# Patient Record
Sex: MALE | Race: WHITE | NOT HISPANIC OR LATINO | Employment: FULL TIME | URBAN - METROPOLITAN AREA
[De-identification: names, ages, dates, MRNs, and addresses within clinical notes are randomized per-mention and may not be internally consistent; named-entity substitution may affect disease eponyms.]

---

## 2019-05-21 ENCOUNTER — HOSPITAL ENCOUNTER (EMERGENCY)
Facility: HOSPITAL | Age: 34
Discharge: LEFT BEFORE DISCHARGE | End: 2019-05-21
Attending: EMERGENCY MEDICINE
Payer: COMMERCIAL

## 2019-05-21 ENCOUNTER — APPOINTMENT (EMERGENCY)
Dept: ULTRASOUND IMAGING | Facility: HOSPITAL | Age: 34
End: 2019-05-21
Payer: COMMERCIAL

## 2019-05-21 VITALS
TEMPERATURE: 98.6 F | RESPIRATION RATE: 18 BRPM | SYSTOLIC BLOOD PRESSURE: 111 MMHG | WEIGHT: 186.95 LBS | DIASTOLIC BLOOD PRESSURE: 63 MMHG | HEART RATE: 78 BPM | OXYGEN SATURATION: 99 %

## 2019-05-21 DIAGNOSIS — N50.819 EPIDIDYMAL PAIN: Primary | ICD-10-CM

## 2019-05-21 LAB
BACTERIA UR QL AUTO: ABNORMAL /HPF
BILIRUB UR QL STRIP: NEGATIVE
CLARITY UR: CLEAR
COLOR UR: YELLOW
GLUCOSE UR STRIP-MCNC: NEGATIVE MG/DL
HGB UR QL STRIP.AUTO: ABNORMAL
KETONES UR STRIP-MCNC: ABNORMAL MG/DL
LEUKOCYTE ESTERASE UR QL STRIP: NEGATIVE
NITRITE UR QL STRIP: NEGATIVE
NON-SQ EPI CELLS URNS QL MICRO: ABNORMAL /HPF
PH UR STRIP.AUTO: 5.5 [PH]
PROT UR STRIP-MCNC: ABNORMAL MG/DL
RBC #/AREA URNS AUTO: ABNORMAL /HPF
SP GR UR STRIP.AUTO: >=1.03 (ref 1–1.03)
UROBILINOGEN UR QL STRIP.AUTO: 2 E.U./DL
WBC #/AREA URNS AUTO: ABNORMAL /HPF

## 2019-05-21 PROCEDURE — 87491 CHLMYD TRACH DNA AMP PROBE: CPT | Performed by: EMERGENCY MEDICINE

## 2019-05-21 PROCEDURE — 87591 N.GONORRHOEAE DNA AMP PROB: CPT | Performed by: EMERGENCY MEDICINE

## 2019-05-21 PROCEDURE — 99284 EMERGENCY DEPT VISIT MOD MDM: CPT

## 2019-05-21 PROCEDURE — 81001 URINALYSIS AUTO W/SCOPE: CPT | Performed by: EMERGENCY MEDICINE

## 2019-05-21 PROCEDURE — 99283 EMERGENCY DEPT VISIT LOW MDM: CPT | Performed by: EMERGENCY MEDICINE

## 2019-05-21 RX ORDER — DOXYCYCLINE HYCLATE 100 MG/1
100 CAPSULE ORAL 2 TIMES DAILY
Qty: 20 CAPSULE | Refills: 0 | Status: SHIPPED | OUTPATIENT
Start: 2019-05-21 | End: 2019-05-31

## 2019-05-22 LAB
C TRACH DNA SPEC QL NAA+PROBE: NEGATIVE
N GONORRHOEA DNA SPEC QL NAA+PROBE: NEGATIVE

## 2019-06-21 ENCOUNTER — HOSPITAL ENCOUNTER (EMERGENCY)
Facility: HOSPITAL | Age: 34
Discharge: HOME | End: 2019-06-21
Attending: EMERGENCY MEDICINE
Payer: COMMERCIAL

## 2019-06-21 VITALS
RESPIRATION RATE: 16 BRPM | HEART RATE: 130 BPM | SYSTOLIC BLOOD PRESSURE: 140 MMHG | DIASTOLIC BLOOD PRESSURE: 79 MMHG | OXYGEN SATURATION: 99 % | TEMPERATURE: 98.9 F

## 2019-06-21 DIAGNOSIS — H72.92 PERFORATED TYMPANIC MEMBRANE, LEFT: Primary | ICD-10-CM

## 2019-06-21 DIAGNOSIS — F41.9 ANXIETY: ICD-10-CM

## 2019-06-21 DIAGNOSIS — J06.9 ACUTE URI: ICD-10-CM

## 2019-06-21 DIAGNOSIS — R00.0 SINUS TACHYCARDIA: ICD-10-CM

## 2019-06-21 LAB
ATRIAL RATE: 128 BPM
P AXIS: 89 DEGREES
PR INTERVAL: 160 MS
QRS AXIS: 91 DEGREES
QRSD INTERVAL: 92 MS
QT INTERVAL: 284 MS
QTC INTERVAL: 414 MS
T WAVE AXIS: 27 DEGREES
VENTRICULAR RATE: 128 BPM

## 2019-06-21 PROCEDURE — 99283 EMERGENCY DEPT VISIT LOW MDM: CPT

## 2019-06-21 PROCEDURE — 93010 ELECTROCARDIOGRAM REPORT: CPT | Performed by: INTERNAL MEDICINE

## 2019-06-21 PROCEDURE — 93005 ELECTROCARDIOGRAM TRACING: CPT

## 2019-06-21 PROCEDURE — 99283 EMERGENCY DEPT VISIT LOW MDM: CPT | Performed by: EMERGENCY MEDICINE

## 2019-06-21 RX ORDER — GUAIFENESIN 600 MG
600 TABLET, EXTENDED RELEASE 12 HR ORAL 2 TIMES DAILY
Qty: 10 TABLET | Refills: 0 | Status: SHIPPED | OUTPATIENT
Start: 2019-06-21 | End: 2019-06-26

## 2019-06-21 RX ORDER — FLUTICASONE PROPIONATE 50 MCG
1 SPRAY, SUSPENSION (ML) NASAL DAILY
Qty: 16 G | Refills: 0 | Status: SHIPPED | OUTPATIENT
Start: 2019-06-21 | End: 2019-12-14

## 2019-06-21 NOTE — ED PROVIDER NOTES
History  Chief Complaint   Patient presents with    Earache     Pt c/o left earache x1 week  Pt states having bloody discharge from ear  Also having fevers at home  Pt states having nasal congestion  During triage is noted pt's HR is 130  Denies CP, SOB  Denies any subtance use other than marijuana this morning  History provided by:  Patient  Earache   Location:  Left  Behind ear:  No abnormality  Quality:  Pressure and sore  Severity:  Severe  Onset quality:  Gradual  Duration:  1 week  Timing:  Constant  Progression:  Worsening  Chronicity:  New  Context comment:  Blooddy drainage after q-tip  associated with uri symptoms  and anxiety  Relieved by:  Nothing  Worsened by:  Nothing  Associated symptoms: congestion, cough, hearing loss (decreased L ear) and sore throat    Associated symptoms: no abdominal pain, no diarrhea, no fever (subjective), no headaches, no neck pain, no rash, no rhinorrhea and no vomiting        None       Past Medical History:   Diagnosis Date    Anxiety     PTSD (post-traumatic stress disorder)        Past Surgical History:   Procedure Laterality Date    SHOULDER SURGERY Left        History reviewed  No pertinent family history  I have reviewed and agree with the history as documented  Social History     Tobacco Use    Smoking status: Current Every Day Smoker     Packs/day: 0 50    Smokeless tobacco: Never Used   Substance Use Topics    Alcohol use: Not Currently    Drug use: Yes     Types: Marijuana        Review of Systems   Constitutional: Negative for activity change, appetite change, fatigue and fever (subjective)  HENT: Positive for congestion, ear pain, hearing loss (decreased L ear), postnasal drip, sinus pressure and sore throat  Negative for dental problem, rhinorrhea, trouble swallowing and voice change  Eyes: Negative for pain and redness  Respiratory: Positive for cough  Negative for chest tightness, shortness of breath and wheezing      Cardiovascular: Negative for chest pain and palpitations  Gastrointestinal: Negative for abdominal pain, blood in stool, constipation, diarrhea, nausea and vomiting  Endocrine: Negative for cold intolerance and heat intolerance  Genitourinary: Negative for dysuria, frequency and hematuria  Musculoskeletal: Negative for arthralgias, myalgias and neck pain  Skin: Negative for color change, pallor and rash  Neurological: Negative for weakness, numbness and headaches  Hematological: Does not bruise/bleed easily  Psychiatric/Behavioral: Negative for agitation, hallucinations and suicidal ideas  The patient is nervous/anxious  Physical Exam  Physical Exam   Constitutional: He is oriented to person, place, and time  He appears well-developed and well-nourished  HENT:   Head: Normocephalic  Right Ear: Hearing, tympanic membrane, external ear and ear canal normal  No mastoid tenderness  Left Ear: External ear and ear canal normal  No mastoid tenderness  Tympanic membrane is perforated  Decreased hearing is noted  Mouth/Throat: Uvula is midline and mucous membranes are normal  Posterior oropharyngeal erythema present  No oropharyngeal exudate or posterior oropharyngeal edema  Eyes: Pupils are equal, round, and reactive to light  Conjunctivae and EOM are normal    Neck: Normal range of motion  Neck supple  No meningeal signs   Cardiovascular: Normal rate, regular rhythm, normal heart sounds and intact distal pulses  Pulmonary/Chest: Effort normal and breath sounds normal  No respiratory distress  He has no wheezes  He has no rales  He exhibits no tenderness  Abdominal: Soft  Bowel sounds are normal  He exhibits no distension and no mass  There is no tenderness  No hernia  No cvat   Musculoskeletal: Normal range of motion  He exhibits no edema  Lymphadenopathy:     He has no cervical adenopathy  Neurological: He is alert and oriented to person, place, and time  No cranial nerve deficit     Skin: No rash noted  No erythema  No edema   Psychiatric: He has a normal mood and affect  His behavior is normal    Nursing note and vitals reviewed  Vital Signs  ED Triage Vitals [06/21/19 1039]   Temperature Pulse Respirations Blood Pressure SpO2   98 9 °F (37 2 °C) (!) 130 16 140/79 99 %      Temp Source Heart Rate Source Patient Position - Orthostatic VS BP Location FiO2 (%)   Oral Monitor Sitting Right arm --      Pain Score       6           Vitals:    06/21/19 1039   BP: 140/79   Pulse: (!) 130   Patient Position - Orthostatic VS: Sitting         Visual Acuity      ED Medications  Medications   sodium chloride 0 9 % bolus 1,000 mL (has no administration in time range)       Diagnostic Studies  Results Reviewed     None                 No orders to display              Procedures  ECG 12 Lead Documentation  Date/Time: 6/21/2019 11:00 AM  Performed by: Felicity Goodwin MD  Authorized by: Felicity Goodwin MD     ECG reviewed by me, the ED Provider: yes    Patient location:  ED  Rate:     ECG rate:  128    ECG rate assessment: tachycardic    Rhythm:     Rhythm: sinus tachycardia    Ectopy:     Ectopy: none    QRS:     QRS axis:  Right    QRS intervals:  Normal  Conduction:     Conduction: normal    ST segments:     ST segments:  Normal  T waves:     T waves: normal             ED Course                               MDM  Number of Diagnoses or Management Options  Acute URI:   Anxiety:   Perforated tympanic membrane, left:   Sinus tachycardia:   Diagnosis management comments: Patient with URI symptoms and left ear pain-patient has URI and perforated TM  Will refer to ENT, treat symptoms  Tachycardia  Patient is in sinus tach on EKG  Patient is refusing lab work, IV fluids for further evaluation of sinus tachycardia  Understands risks associated with this  Will be discharged home  Anxiety without SI or HI-will give Atarax        Disposition  Final diagnoses:   Perforated tympanic membrane, left   Acute URI Sinus tachycardia   Anxiety     Time reflects when diagnosis was documented in both MDM as applicable and the Disposition within this note     Time User Action Codes Description Comment    6/21/2019 11:08 AM Maura Cruz Add [H72 92] Perforated tympanic membrane, left     6/21/2019 11:08 AM Anel EDWARDS Add [J06 9] Acute URI     6/21/2019 11:08 AM Carolyn Melendez Add [R00 0] Sinus tachycardia     6/21/2019 11:08 AM Mauraenedina Cruz Add [F41 9] Anxiety       ED Disposition     ED Disposition Condition Date/Time Comment    Discharge Stable Fri Jun 21, 2019 11:08 AM Javan Mercedes discharge to home/self care  Follow-up Information     Follow up With Specialties Details Why Contact Info    [employer]  Schedule an appointment as soon as possible for a visit in 2 days  [facility], DO Otolaryngology Schedule an appointment as soon as possible for a visit in 2 days  [address]  [address] [place] [address]  [phone]            Patient's Medications   Discharge Prescriptions    FLUTICASONE (FLONASE) 50 MCG/ACT NASAL SPRAY    1 spray into each nostril daily       Start Date: 6/21/2019 End Date: --       Order Dose: 1 spray       Quantity: 16 g    Refills: 0    GUAIFENESIN (MUCINEX) 600 MG 12 HR TABLET    Take 1 tablet (600 mg total) by mouth 2 (two) times a day for 5 days       Start Date: 6/21/2019 End Date: 6/26/2019       Order Dose: 600 mg       Quantity: 10 tablet    Refills: 0     No discharge procedures on file      ED Provider  Electronically Signed by           Patrica East MD  06/21/19 3656

## 2019-12-14 ENCOUNTER — APPOINTMENT (EMERGENCY)
Dept: RADIOLOGY | Facility: HOSPITAL | Age: 34
DRG: 137 | End: 2019-12-14
Payer: COMMERCIAL

## 2019-12-14 ENCOUNTER — HOSPITAL ENCOUNTER (INPATIENT)
Facility: HOSPITAL | Age: 34
LOS: 1 days | Discharge: HOME | DRG: 137 | End: 2019-12-16
Attending: EMERGENCY MEDICINE | Admitting: INTERNAL MEDICINE
Payer: COMMERCIAL

## 2019-12-14 ENCOUNTER — APPOINTMENT (EMERGENCY)
Dept: CT IMAGING | Facility: HOSPITAL | Age: 34
DRG: 137 | End: 2019-12-14
Payer: COMMERCIAL

## 2019-12-14 DIAGNOSIS — R00.0 TACHYCARDIA: ICD-10-CM

## 2019-12-14 DIAGNOSIS — A41.9 SEPSIS DUE TO PNEUMONIA (HCC): ICD-10-CM

## 2019-12-14 DIAGNOSIS — T40.1X1A HEROIN OVERDOSE (HCC): Primary | ICD-10-CM

## 2019-12-14 DIAGNOSIS — J69.0 ASPIRATION PNEUMONIA (HCC): ICD-10-CM

## 2019-12-14 DIAGNOSIS — D72.829 LEUCOCYTOSIS: ICD-10-CM

## 2019-12-14 DIAGNOSIS — J18.9 SEPSIS DUE TO PNEUMONIA (HCC): ICD-10-CM

## 2019-12-14 DIAGNOSIS — R05.9 COUGH: ICD-10-CM

## 2019-12-14 PROBLEM — F17.200 TOBACCO DEPENDENCE: Status: ACTIVE | Noted: 2019-12-14

## 2019-12-14 LAB
ALBUMIN SERPL BCP-MCNC: 4.1 G/DL (ref 3.5–5)
ALP SERPL-CCNC: 79 U/L (ref 46–116)
ALT SERPL W P-5'-P-CCNC: 39 U/L (ref 12–78)
AMPHETAMINES SERPL QL SCN: NEGATIVE
ANION GAP SERPL CALCULATED.3IONS-SCNC: 10 MMOL/L (ref 4–13)
AST SERPL W P-5'-P-CCNC: 19 U/L (ref 5–45)
BARBITURATES UR QL: NEGATIVE
BASOPHILS # BLD AUTO: 0.04 THOUSANDS/ΜL (ref 0–0.1)
BASOPHILS NFR BLD AUTO: 0 % (ref 0–1)
BENZODIAZ UR QL: NEGATIVE
BILIRUB SERPL-MCNC: 0.46 MG/DL (ref 0.2–1)
BUN SERPL-MCNC: 15 MG/DL (ref 5–25)
CALCIUM SERPL-MCNC: 8.6 MG/DL (ref 8.3–10.1)
CHLORIDE SERPL-SCNC: 105 MMOL/L (ref 100–108)
CO2 SERPL-SCNC: 22 MMOL/L (ref 21–32)
COCAINE UR QL: NEGATIVE
CREAT SERPL-MCNC: 0.92 MG/DL (ref 0.6–1.3)
EOSINOPHIL # BLD AUTO: 0 THOUSAND/ΜL (ref 0–0.61)
EOSINOPHIL NFR BLD AUTO: 0 % (ref 0–6)
ERYTHROCYTE [DISTWIDTH] IN BLOOD BY AUTOMATED COUNT: 12.5 % (ref 11.6–15.1)
GFR SERPL CREATININE-BSD FRML MDRD: 109 ML/MIN/1.73SQ M
GLUCOSE SERPL-MCNC: 126 MG/DL (ref 65–140)
HCT VFR BLD AUTO: 46.8 % (ref 36.5–49.3)
HGB BLD-MCNC: 15.6 G/DL (ref 12–17)
IMM GRANULOCYTES # BLD AUTO: 0.11 THOUSAND/UL (ref 0–0.2)
IMM GRANULOCYTES NFR BLD AUTO: 1 % (ref 0–2)
LACTATE SERPL-SCNC: 0.8 MMOL/L (ref 0.5–2)
LYMPHOCYTES # BLD AUTO: 0.96 THOUSANDS/ΜL (ref 0.6–4.47)
LYMPHOCYTES NFR BLD AUTO: 4 % (ref 14–44)
MCH RBC QN AUTO: 30.6 PG (ref 26.8–34.3)
MCHC RBC AUTO-ENTMCNC: 33.3 G/DL (ref 31.4–37.4)
MCV RBC AUTO: 92 FL (ref 82–98)
METHADONE UR QL: NEGATIVE
MONOCYTES # BLD AUTO: 0.82 THOUSAND/ΜL (ref 0.17–1.22)
MONOCYTES NFR BLD AUTO: 4 % (ref 4–12)
NEUTROPHILS # BLD AUTO: 21.31 THOUSANDS/ΜL (ref 1.85–7.62)
NEUTS SEG NFR BLD AUTO: 91 % (ref 43–75)
NRBC BLD AUTO-RTO: 0 /100 WBCS
OPIATES UR QL SCN: POSITIVE
PCP UR QL: NEGATIVE
PLATELET # BLD AUTO: 274 THOUSANDS/UL (ref 149–390)
PMV BLD AUTO: 10.1 FL (ref 8.9–12.7)
POTASSIUM SERPL-SCNC: 4.2 MMOL/L (ref 3.5–5.3)
PROT SERPL-MCNC: 7.1 G/DL (ref 6.4–8.2)
RBC # BLD AUTO: 5.1 MILLION/UL (ref 3.88–5.62)
SODIUM SERPL-SCNC: 137 MMOL/L (ref 136–145)
THC UR QL: NEGATIVE
WBC # BLD AUTO: 23.24 THOUSAND/UL (ref 4.31–10.16)

## 2019-12-14 PROCEDURE — 36415 COLL VENOUS BLD VENIPUNCTURE: CPT | Performed by: EMERGENCY MEDICINE

## 2019-12-14 PROCEDURE — 80307 DRUG TEST PRSMV CHEM ANLYZR: CPT | Performed by: EMERGENCY MEDICINE

## 2019-12-14 PROCEDURE — 94640 AIRWAY INHALATION TREATMENT: CPT

## 2019-12-14 PROCEDURE — 80053 COMPREHEN METABOLIC PANEL: CPT | Performed by: EMERGENCY MEDICINE

## 2019-12-14 PROCEDURE — 71275 CT ANGIOGRAPHY CHEST: CPT

## 2019-12-14 PROCEDURE — 96374 THER/PROPH/DIAG INJ IV PUSH: CPT

## 2019-12-14 PROCEDURE — 85025 COMPLETE CBC W/AUTO DIFF WBC: CPT | Performed by: EMERGENCY MEDICINE

## 2019-12-14 PROCEDURE — 84145 PROCALCITONIN (PCT): CPT | Performed by: NURSE PRACTITIONER

## 2019-12-14 PROCEDURE — 71046 X-RAY EXAM CHEST 2 VIEWS: CPT

## 2019-12-14 PROCEDURE — 87040 BLOOD CULTURE FOR BACTERIA: CPT | Performed by: EMERGENCY MEDICINE

## 2019-12-14 PROCEDURE — 99285 EMERGENCY DEPT VISIT HI MDM: CPT

## 2019-12-14 PROCEDURE — 83605 ASSAY OF LACTIC ACID: CPT | Performed by: EMERGENCY MEDICINE

## 2019-12-14 PROCEDURE — 96361 HYDRATE IV INFUSION ADD-ON: CPT

## 2019-12-14 PROCEDURE — 99220 PR INITIAL OBSERVATION CARE/DAY 70 MINUTES: CPT | Performed by: NURSE PRACTITIONER

## 2019-12-14 PROCEDURE — 99284 EMERGENCY DEPT VISIT MOD MDM: CPT | Performed by: EMERGENCY MEDICINE

## 2019-12-14 RX ORDER — ACETAMINOPHEN 325 MG/1
650 TABLET ORAL EVERY 6 HOURS PRN
Status: DISCONTINUED | OUTPATIENT
Start: 2019-12-14 | End: 2019-12-16 | Stop reason: HOSPADM

## 2019-12-14 RX ORDER — GUAIFENESIN 600 MG
600 TABLET, EXTENDED RELEASE 12 HR ORAL 2 TIMES DAILY
Status: DISCONTINUED | OUTPATIENT
Start: 2019-12-14 | End: 2019-12-16 | Stop reason: HOSPADM

## 2019-12-14 RX ORDER — NALOXONE HYDROCHLORIDE 1 MG/ML
2 INJECTION PARENTERAL ONCE
Status: DISCONTINUED | OUTPATIENT
Start: 2019-12-14 | End: 2019-12-16 | Stop reason: HOSPADM

## 2019-12-14 RX ORDER — ALBUTEROL SULFATE 2.5 MG/3ML
5 SOLUTION RESPIRATORY (INHALATION) ONCE
Status: COMPLETED | OUTPATIENT
Start: 2019-12-14 | End: 2019-12-14

## 2019-12-14 RX ORDER — ONDANSETRON 2 MG/ML
4 INJECTION INTRAMUSCULAR; INTRAVENOUS EVERY 6 HOURS PRN
Status: DISCONTINUED | OUTPATIENT
Start: 2019-12-14 | End: 2019-12-16 | Stop reason: HOSPADM

## 2019-12-14 RX ORDER — METRONIDAZOLE 500 MG/1
500 TABLET ORAL EVERY 8 HOURS SCHEDULED
Status: DISCONTINUED | OUTPATIENT
Start: 2019-12-15 | End: 2019-12-14

## 2019-12-14 RX ORDER — SODIUM CHLORIDE 9 MG/ML
75 INJECTION, SOLUTION INTRAVENOUS CONTINUOUS
Status: DISCONTINUED | OUTPATIENT
Start: 2019-12-14 | End: 2019-12-15

## 2019-12-14 RX ORDER — ONDANSETRON 2 MG/ML
4 INJECTION INTRAMUSCULAR; INTRAVENOUS ONCE
Status: COMPLETED | OUTPATIENT
Start: 2019-12-14 | End: 2019-12-14

## 2019-12-14 RX ORDER — MAGNESIUM HYDROXIDE/ALUMINUM HYDROXICE/SIMETHICONE 120; 1200; 1200 MG/30ML; MG/30ML; MG/30ML
30 SUSPENSION ORAL EVERY 6 HOURS PRN
Status: DISCONTINUED | OUTPATIENT
Start: 2019-12-14 | End: 2019-12-16 | Stop reason: HOSPADM

## 2019-12-14 RX ORDER — NICOTINE 21 MG/24HR
1 PATCH, TRANSDERMAL 24 HOURS TRANSDERMAL DAILY
Status: DISCONTINUED | OUTPATIENT
Start: 2019-12-15 | End: 2019-12-16 | Stop reason: HOSPADM

## 2019-12-14 RX ORDER — AZITHROMYCIN 250 MG/1
500 TABLET, FILM COATED ORAL EVERY 24 HOURS
Status: DISCONTINUED | OUTPATIENT
Start: 2019-12-14 | End: 2019-12-14

## 2019-12-14 RX ADMIN — CEFTRIAXONE SODIUM 1000 MG: 10 INJECTION, POWDER, FOR SOLUTION INTRAVENOUS at 23:11

## 2019-12-14 RX ADMIN — IOHEXOL 85 ML: 350 INJECTION, SOLUTION INTRAVENOUS at 19:38

## 2019-12-14 RX ADMIN — ONDANSETRON 4 MG: 2 INJECTION INTRAMUSCULAR; INTRAVENOUS at 17:25

## 2019-12-14 RX ADMIN — SODIUM CHLORIDE 75 ML/HR: 0.9 INJECTION, SOLUTION INTRAVENOUS at 23:11

## 2019-12-14 RX ADMIN — METRONIDAZOLE 500 MG: 500 INJECTION, SOLUTION INTRAVENOUS at 20:36

## 2019-12-14 RX ADMIN — GUAIFENESIN 600 MG: 600 TABLET ORAL at 23:11

## 2019-12-14 RX ADMIN — ALBUTEROL SULFATE 5 MG: 2.5 SOLUTION RESPIRATORY (INHALATION) at 18:44

## 2019-12-14 RX ADMIN — SODIUM CHLORIDE 1000 ML: 0.9 INJECTION, SOLUTION INTRAVENOUS at 17:27

## 2019-12-14 RX ADMIN — SODIUM CHLORIDE 1000 ML: 0.9 INJECTION, SOLUTION INTRAVENOUS at 15:54

## 2019-12-14 RX ADMIN — IPRATROPIUM BROMIDE 0.5 MG: 0.5 SOLUTION RESPIRATORY (INHALATION) at 18:46

## 2019-12-14 NOTE — LETTER
[id number]   Dept: [phone]    December 16, 2019     Patient: Coleen Arredondo   YOB: 1985   Date of Visit: 12/14/2019       To Whom it May Concern:    Coleen Arredondo is under my professional care  He was seen in the hospital from 12/14/2019   to 12/16/19  He may return to work on 12/17/2019 without limitations  If you have any questions or concerns, please don't hesitate to call           Sincerely,          Omer Reason, DO

## 2019-12-14 NOTE — ED NOTES
Went into pt's room to sit pt up and to put O2 on pt  Pt is refusing to sit up or put on oxygen  Dr Denver Macario aware  Pt O2 is at 89% at this time        Ras Chase  12/14/19 8082

## 2019-12-14 NOTE — ED NOTES
Pt aware of necessary urine specimen  Pt refusing to provide urine specimen at this time        Winnie Elmore  12/14/19 7598

## 2019-12-14 NOTE — ED PROVIDER NOTES
History  Chief Complaint   Patient presents with    Heroin Overdose - Accidental     pt stated " i overdose with heroin 1 bag injected on left arm  History provided by:  Patient   used: No    Overdose - Accidental   Time since incident:  1 hour  Ingestion amount:  1 bag of Heroin  Witnesses present: no    Called poison control: no    Incident location:  Unable to specify  Context: accidental ingestion    Associated symptoms: altered mental status (resolved after Narcan)    Associated symptoms: no abdominal pain, no agitation, no chest pain, no cough, no diarrhea, no headaches, no nausea, no shortness of breath and no vomiting    Risk factors: no similar prior episodes (patient denies)        None       Past Medical History:   Diagnosis Date    Anxiety     PTSD (post-traumatic stress disorder)        Past Surgical History:   Procedure Laterality Date    SHOULDER SURGERY Left        History reviewed  No pertinent family history  I have reviewed and agree with the history as documented  Social History     Tobacco Use    Smoking status: Current Every Day Smoker     Packs/day: 0 50    Smokeless tobacco: Never Used   Substance Use Topics    Alcohol use: Not Currently    Drug use: Yes     Types: Marijuana, Heroin        Review of Systems   Constitutional: Negative for chills and fever  HENT: Negative for facial swelling, sore throat and trouble swallowing  Eyes: Negative for pain and visual disturbance  Respiratory: Negative for cough and shortness of breath  Cardiovascular: Negative for chest pain and leg swelling  Gastrointestinal: Negative for abdominal pain, blood in stool, diarrhea, nausea and vomiting  Genitourinary: Negative for dysuria and flank pain  Musculoskeletal: Negative for back pain, neck pain and neck stiffness  Skin: Negative for pallor and rash  Allergic/Immunologic: Negative for environmental allergies and immunocompromised state  Neurological: Negative for dizziness and headaches  Hematological: Negative for adenopathy  Does not bruise/bleed easily  Psychiatric/Behavioral: Negative for agitation and behavioral problems  All other systems reviewed and are negative  Physical Exam  Physical Exam   Constitutional: He is oriented to person, place, and time  He appears well-developed and well-nourished  No distress  HENT:   Head: Normocephalic and atraumatic  Eyes: EOM are normal    Neck: Normal range of motion  Neck supple  Cardiovascular: Normal rate, regular rhythm, normal heart sounds and intact distal pulses  Pulmonary/Chest: Effort normal and breath sounds normal    Abdominal: Soft  Bowel sounds are normal  There is no tenderness  There is no rebound and no guarding  Musculoskeletal: Normal range of motion  Neurological: He is alert and oriented to person, place, and time  Skin: Skin is warm and dry  Psychiatric: He has a normal mood and affect  Nursing note and vitals reviewed        Vital Signs  ED Triage Vitals   Temperature Pulse Respirations Blood Pressure SpO2   12/14/19 1557 12/14/19 1517 12/14/19 1517 12/14/19 1517 12/14/19 1517   97 8 °F (36 6 °C) (!) 114 14 132/75 95 %      Temp Source Heart Rate Source Patient Position - Orthostatic VS BP Location FiO2 (%)   12/14/19 1557 12/14/19 1517 12/14/19 1517 12/14/19 1517 --   Oral Monitor Sitting Right arm       Pain Score       12/14/19 1517       No Pain           Vitals:    12/14/19 1630 12/14/19 1654 12/14/19 1730 12/14/19 1800   BP: 116/69 107/70 115/74 124/71   Pulse: (!) 114 (!) 119 (!) 116 (!) 124   Patient Position - Orthostatic VS:  Sitting Sitting Sitting         Visual Acuity      ED Medications  Medications   naloxone (FOR EMS ONLY) (NARCAN) 2 MG/2ML injection 4 mg (has no administration in time range)   sodium chloride 0 9 % bolus 1,000 mL (0 mL Intravenous Stopped 12/14/19 1659)   sodium chloride 0 9 % bolus 1,000 mL (0 mL Intravenous Stopped 12/14/19 1807)   ondansetron (ZOFRAN) injection 4 mg (4 mg Intravenous Given 12/14/19 1725)   albuterol inhalation solution 5 mg (5 mg Nebulization Given 12/14/19 1844)   ipratropium (ATROVENT) 0 02 % inhalation solution 0 5 mg (0 5 mg Nebulization Given 12/14/19 1846)       Diagnostic Studies  Results Reviewed     Procedure Component Value Units Date/Time    CBC and differential [[id number]]  (Abnormal) Collected:  12/14/19 1724    Lab Status:  Final result Specimen:  Blood from Hand, Right Updated:  12/14/19 1836     WBC 23 24 Thousand/uL      RBC 5 10 Million/uL      Hemoglobin 15 6 g/dL      Hematocrit 46 8 %      MCV 92 fL      MCH 30 6 pg      MCHC 33 3 g/dL      RDW 12 5 %      MPV 10 1 fL      Platelets 652 Thousands/uL      nRBC 0 /100 WBCs      Neutrophils Relative 91 %      Immat GRANS % 1 %      Lymphocytes Relative 4 %      Monocytes Relative 4 %      Eosinophils Relative 0 %      Basophils Relative 0 %      Neutrophils Absolute 21 31 Thousands/µL      Immature Grans Absolute 0 11 Thousand/uL      Lymphocytes Absolute 0 96 Thousands/µL      Monocytes Absolute 0 82 Thousand/µL      Eosinophils Absolute 0 00 Thousand/µL      Basophils Absolute 0 04 Thousands/µL     Narrative: This is an appended report  These results have been appended to a previously verified report  Blood culture #1 [[id number]] Collected:  12/14/19 1822    Lab Status: In process Specimen:  Blood from Hand, Right Updated:  12/14/19 1828    Blood culture #2 [[id number]] Collected:  12/14/19 1823    Lab Status: In process Specimen:  Blood from Arm, Right Updated:  12/14/19 1827    Lactic acid, plasma x2 [[id number]] Collected:  12/14/19 1822    Lab Status:   In process Specimen:  Blood from Hand, Right Updated:  12/14/19 1827    Lactic acid, plasma x2 [[id number]]     Lab Status:  No result Specimen:  Blood     Comprehensive metabolic panel [[id number]] Collected:  12/14/19 1724    Lab Status:  Final result Specimen:  Blood from Hand, Right Updated:  12/14/19 1748     Sodium 137 mmol/L      Potassium 4 2 mmol/L      Chloride 105 mmol/L      CO2 22 mmol/L      ANION GAP 10 mmol/L      BUN 15 mg/dL      Creatinine 0 92 mg/dL      Glucose 126 mg/dL      Calcium 8 6 mg/dL      AST 19 U/L      ALT 39 U/L      Alkaline Phosphatase 79 U/L      Total Protein 7 1 g/dL      Albumin 4 1 g/dL      Total Bilirubin 0 46 mg/dL      eGFR 109 ml/min/1 73sq m     Narrative:       [place] guidelines for Chronic Kidney Disease (CKD):     Stage 1 with normal or high GFR (GFR > 90 mL/min/1 73 square meters)    Stage 2 Mild CKD (GFR = 60-89 mL/min/1 73 square meters)    Stage 3A Moderate CKD (GFR = 45-59 mL/min/1 73 square meters)    Stage 3B Moderate CKD (GFR = 30-44 mL/min/1 73 square meters)    Stage 4 Severe CKD (GFR = 15-29 mL/min/1 73 square meters)    Stage 5 End Stage CKD (GFR <15 mL/min/1 73 square meters)  Note: GFR calculation is accurate only with a steady state creatinine    Rapid drug screen, urine [[id number]]     Lab Status:  No result Specimen:  Urine                  XR chest 2 views    (Results Pending)   CTA ED chest PE study    (Results Pending)              Procedures  Procedures         ED Course  ED Course as of Dec 14 1848   Sat Dec 14, 2019   1708 Blood Pressure: 107/70   1708 Patient continues to be tachycardic, we will check labs, give 2nd litre NSS  Pulse(!): 119   1805 WBC(!): 23 24   1805 Leucocytosis noted, in setting of tachycardia, we will check lactic acid, blood, cx; possible aspiration as patient was unresponsive after heroin overdose, we will get CXR  Pulse(!): 124   1846 CXR reviewed, no focal consolidation noted  Patient now says that he has been sick for about a month with cough, associated with yellow cough  Patient still tachycardic, we will get CT PE study         1900   Patient was discussed with and signed out to Dr Alley Meredith for follow up of CTA Chest, concern about possible aspiration Vs pneumonia in setting of persistent cough for about 1 month, yellow phlem, unresponsive episode related heroin overdose (?aspiration), SIRS with tachycardia, low Oxygen sats and leucocytosis  Initial Sepsis Screening     Row Name 12/14/19 4102                Is the patient's history suggestive of a new or worsening infection? (!) Yes (Proceed)  -SA        Suspected source of infection  suspect infection, source unknown  -SA        Are two or more of the following signs & symptoms of infection both present and new to the patient? (!) Yes (Proceed)  -SA        Indicate SIRS criteria  Leukocytosis (WBC > 95233 IJL); Tachycardia > 90 bpm  -SA        If the answer is yes to both questions, suspicion of sepsis is present  --        If severe sepsis is present AND tissue hypoperfusion perists in the hour after fluid resuscitation or lactate > 4, the patient meets criteria for SEPTIC SHOCK  --        Are any of the following organ dysfunction criteria present within 6 hours of suspected infection and SIRS criteria that are NOT considered to be chronic conditions?   --        Organ dysfunction  --        Date of presentation of severe sepsis  --        Time of presentation of severe sepsis  --        Tissue hypoperfusion persists in the hour after crystalloid fluid administration, evidenced, by either:  --        Was hypotension present within one hour of the conclusion of crystalloid fluid administration?  --        Date of presentation of septic shock  --        Time of presentation of septic shock  --          User Key  (r) = Recorded By, (t) = Taken By, (c) = Cosigned By    [address] Name Provider Type    SA Lolita Abarca MD Physician            Nataliia Collins Criteria for PE      Most Recent Value   Wells' Criteria for PE   Clinical signs and symptoms of DVT  0 Filed at: 12/14/2019 1840   PE is primary diagnosis or equally likely  3 Filed at: 12/14/2019 1840   HR >100  1 5 Filed at: 12/14/2019 1840 Immobilization at least 3 days or Surgery in the previous 4 weeks  0 Filed at: 12/14/2019 1840   Previous, objectively diagnosed PE or DVT  0 Filed at: 12/14/2019 1840   Hemoptysis  0 Filed at: 12/14/2019 1840   Malignancy with treatment within 6 months or palliative  0 Filed at: 12/14/2019 1840   Wells' Criteria Total  4 5 Filed at: 12/14/2019 1840            [facility]  Number of Diagnoses or Management Options  [facility] ):   Cough: new and requires workup  Heroin overdose ([facility] ): new and requires workup  Leucocytosis: new and requires workup  Tachycardia: new and requires workup  Diagnosis management comments: Patient is a 61-year-old male, brought in by EMS after a friend called 911 as patient became unresponsive after injecting 1 bag of heroin; friend tried to do CPR, however as per EMS patient had pulse on arrival, was given Narcan 2 mg intranasally and to IV; patient became conscious, alert and walk to the ER after being brought    On exam patient appears a little sleepy, vital signs noted for tachycardia, pupils equal round reactive to light, no cranial nerve or focal neuro deficits, lungs clear, heart sounds normal        Amount and/or Complexity of Data Reviewed  Clinical lab tests: reviewed and ordered  Tests in the radiology section of CPT®: ordered and reviewed  Tests in the medicine section of CPT®: ordered and reviewed  Independent visualization of images, tracings, or specimens: yes          Disposition  Final diagnoses:   Heroin overdose ([facility] )   Tachycardia   Leucocytosis   Cough     Time reflects when diagnosis was documented in both MDM as applicable and the Disposition within this note     Time User Action Codes Description Comment    12/14/2019  5:11 PM Jose Manuel Dejesus 134 Heroin overdose ([facility] )     12/14/2019  6:29 PM Ivana Terrazas Add [R00 0] Tachycardia     12/14/2019  6:30 PM Ivana Terrazas Add [D72 829] Leucocytosis     12/14/2019  6:48 PM Ivana Terrazas Add [R05] Cough ED Disposition     None      Follow-up Information    None         Patient's Medications   Discharge Prescriptions    No medications on file     No discharge procedures on file      ED Provider  Electronically Signed by           Selin Nelson MD  12/14/19 2848

## 2019-12-15 LAB
ANION GAP SERPL CALCULATED.3IONS-SCNC: 8 MMOL/L (ref 4–13)
BASOPHILS # BLD AUTO: 0.03 THOUSANDS/ΜL (ref 0–0.1)
BASOPHILS NFR BLD AUTO: 0 % (ref 0–1)
BUN SERPL-MCNC: 11 MG/DL (ref 5–25)
CALCIUM SERPL-MCNC: 8.3 MG/DL (ref 8.3–10.1)
CHLORIDE SERPL-SCNC: 109 MMOL/L (ref 100–108)
CO2 SERPL-SCNC: 27 MMOL/L (ref 21–32)
CREAT SERPL-MCNC: 0.78 MG/DL (ref 0.6–1.3)
EOSINOPHIL # BLD AUTO: 0.05 THOUSAND/ΜL (ref 0–0.61)
EOSINOPHIL NFR BLD AUTO: 0 % (ref 0–6)
ERYTHROCYTE [DISTWIDTH] IN BLOOD BY AUTOMATED COUNT: 12.8 % (ref 11.6–15.1)
GFR SERPL CREATININE-BSD FRML MDRD: 119 ML/MIN/1.73SQ M
GLUCOSE SERPL-MCNC: 85 MG/DL (ref 65–140)
HCT VFR BLD AUTO: 42.6 % (ref 36.5–49.3)
HGB BLD-MCNC: 14 G/DL (ref 12–17)
IMM GRANULOCYTES # BLD AUTO: 0.05 THOUSAND/UL (ref 0–0.2)
IMM GRANULOCYTES NFR BLD AUTO: 0 % (ref 0–2)
L PNEUMO1 AG UR QL IA.RAPID: NEGATIVE
LYMPHOCYTES # BLD AUTO: 2.24 THOUSANDS/ΜL (ref 0.6–4.47)
LYMPHOCYTES NFR BLD AUTO: 16 % (ref 14–44)
MCH RBC QN AUTO: 30.6 PG (ref 26.8–34.3)
MCHC RBC AUTO-ENTMCNC: 32.9 G/DL (ref 31.4–37.4)
MCV RBC AUTO: 93 FL (ref 82–98)
MONOCYTES # BLD AUTO: 0.98 THOUSAND/ΜL (ref 0.17–1.22)
MONOCYTES NFR BLD AUTO: 7 % (ref 4–12)
NEUTROPHILS # BLD AUTO: 10.64 THOUSANDS/ΜL (ref 1.85–7.62)
NEUTS SEG NFR BLD AUTO: 77 % (ref 43–75)
NRBC BLD AUTO-RTO: 0 /100 WBCS
PLATELET # BLD AUTO: 234 THOUSANDS/UL (ref 149–390)
PMV BLD AUTO: 10.5 FL (ref 8.9–12.7)
POTASSIUM SERPL-SCNC: 3.9 MMOL/L (ref 3.5–5.3)
PROCALCITONIN SERPL-MCNC: <0.05 NG/ML
PROCALCITONIN SERPL-MCNC: <0.05 NG/ML
RBC # BLD AUTO: 4.57 MILLION/UL (ref 3.88–5.62)
S PNEUM AG UR QL: NEGATIVE
SODIUM SERPL-SCNC: 144 MMOL/L (ref 136–145)
TROPONIN I SERPL-MCNC: <0.02 NG/ML
WBC # BLD AUTO: 13.99 THOUSAND/UL (ref 4.31–10.16)

## 2019-12-15 PROCEDURE — 99232 SBSQ HOSP IP/OBS MODERATE 35: CPT | Performed by: INTERNAL MEDICINE

## 2019-12-15 PROCEDURE — 84145 PROCALCITONIN (PCT): CPT | Performed by: NURSE PRACTITIONER

## 2019-12-15 PROCEDURE — 80048 BASIC METABOLIC PNL TOTAL CA: CPT | Performed by: NURSE PRACTITIONER

## 2019-12-15 PROCEDURE — 87449 NOS EACH ORGANISM AG IA: CPT | Performed by: NURSE PRACTITIONER

## 2019-12-15 PROCEDURE — 87070 CULTURE OTHR SPECIMN AEROBIC: CPT | Performed by: NURSE PRACTITIONER

## 2019-12-15 PROCEDURE — 87205 SMEAR GRAM STAIN: CPT | Performed by: NURSE PRACTITIONER

## 2019-12-15 PROCEDURE — 85025 COMPLETE CBC W/AUTO DIFF WBC: CPT | Performed by: NURSE PRACTITIONER

## 2019-12-15 PROCEDURE — 94640 AIRWAY INHALATION TREATMENT: CPT

## 2019-12-15 PROCEDURE — 84484 ASSAY OF TROPONIN QUANT: CPT | Performed by: INTERNAL MEDICINE

## 2019-12-15 RX ORDER — IPRATROPIUM BROMIDE AND ALBUTEROL SULFATE 2.5; .5 MG/3ML; MG/3ML
3 SOLUTION RESPIRATORY (INHALATION)
Status: DISCONTINUED | OUTPATIENT
Start: 2019-12-15 | End: 2019-12-15

## 2019-12-15 RX ORDER — IPRATROPIUM BROMIDE AND ALBUTEROL SULFATE 2.5; .5 MG/3ML; MG/3ML
3 SOLUTION RESPIRATORY (INHALATION) 3 TIMES DAILY
Status: DISCONTINUED | OUTPATIENT
Start: 2019-12-15 | End: 2019-12-16

## 2019-12-15 RX ADMIN — IPRATROPIUM BROMIDE AND ALBUTEROL SULFATE 3 ML: 2.5; .5 SOLUTION RESPIRATORY (INHALATION) at 19:56

## 2019-12-15 RX ADMIN — ACETAMINOPHEN 650 MG: 325 TABLET ORAL at 05:11

## 2019-12-15 RX ADMIN — GUAIFENESIN 600 MG: 600 TABLET ORAL at 08:22

## 2019-12-15 RX ADMIN — GUAIFENESIN 600 MG: 600 TABLET ORAL at 17:34

## 2019-12-15 RX ADMIN — NICOTINE 1 PATCH: 14 PATCH TRANSDERMAL at 08:21

## 2019-12-15 RX ADMIN — IPRATROPIUM BROMIDE AND ALBUTEROL SULFATE 3 ML: 2.5; .5 SOLUTION RESPIRATORY (INHALATION) at 13:55

## 2019-12-15 NOTE — UTILIZATION REVIEW
Initial Clinical Review    Admission: Date/Time/Statement: OBS [email or web address] TO [email or web address]/T ACUTE RESP DISTRESS FROM PNEUMONIA/ASPIRATION FROM HEROINE ABUSE       12/15/19 1052  Inpatient Admission Once     Transfer Service: General Medicine    Expected Discharge Time: Afternoon    Expected Discharge Date: 12/16/19       Question Answer Comment   Admitting Physician Isaura Diaz    Level of Care Med Surg    Estimated length of stay More than 2 Midnights    Certification I certify that inpatient services are medically necessary for this patient for a duration of greater than two midnights  See H&P and MD Progress Notes for additional information about the patient's course of treatment  12/15/19 1051         ED Arrival Information     Expected Arrival Acuity Means of Arrival Escorted By Service Admission Type    - 12/14/2019 15:06 Emergent [facility]ulance [facility] ([address]) [address] Complaint    Heroin Overdose        Chief Complaint   Patient presents with    Heroin Overdose - Accidental     pt stated " i overdose with heroin 1 bag injected on left arm  Assessment/Plan: 35 yo male with heroine overdose s/p narcan to ED by ems admitted observation upgraded to Inpatient d/t Acute resp distress r/t sepsis vs/ SIRS d/t pneumonia and heroine abuse  Patient with leukocytosis and tachycardia; reports cough and SOB; denies fever  snorts but today he injected heroin; has been snorting for the last 8 months  Reports SOB for 1 month and cough productive of sputum "in all colors " Did not provide information about sputum, states, "I said all colors " reports pleuritic chest pain with respiration and SOB with activity  R/o bacterial vs viral vs pneumonitis  CT: Findings suggestive of mild aspiration or pneumonia in the dependent portions of the right lung  PCT   Blood cx  Flagyl, IV Rocephin  Mucinex  Urine antigens          ED Triage Vitals   Temperature Pulse Respirations Blood Pressure SpO2   12/14/19 1557 12/14/19 1517 12/14/19 1517 12/14/19 1517 12/14/19 1517   97 8 °F (36 6 °C) (!) 114 14 132/75 95 %      Temp Source Heart Rate Source Patient Position - Orthostatic VS BP Location FiO2 (%)   12/14/19 1557 12/14/19 1517 12/14/19 1517 12/14/19 1517 --   Oral Monitor Sitting Right arm       Pain Score       12/14/19 1517       No Pain        Wt Readings from Last 1 Encounters:   05/21/19 84 8 kg (186 lb 15 2 oz)     Additional Vital Signs:     12/15/19 07:19:03  97 8 °F (36 6 °C)  61  14  119/64  82  97 %  --  --   12/15/19 0400  --  63  --  --  --  96 %  --  --   12/14/19 2356  --  93  --  --  --  95 %  --  --   12/14/19 2054  --  114Abnormal   16  114/54  --  94 %  None (Room air)  Lying   12/14/19 2009  --  112Abnormal   16  123/59  --  93 %  None (Room air)  Lying   12/14/19 1907  --  117Abnormal   16  120/56  --  95 %  Other (comment)   Lying   O2 Device: Breathing treatment at 12/14/19 1907   12/14/19 1800  --  124Abnormal   15  124/71  --  93 %  None (Room air)  Sitting   12/14/19 1730  --  116Abnormal   15  115/74  --  91 %  None (Room air)  Sitting   12/14/19 1654  --  119Abnormal   15  107/70  --  94 %  None (Room air)  Sitting   12/14/19 1630  --  114Abnormal   14  116/69  --  92 %  None (Room air)  --   12/14/19 1557  97 8 °F (36 6 °C)  --  --  --  --  --  --  --   12/14/19 1555  --  120Abnormal   14  122/70  --  96 %  None (Room air)  Sitting   12/14/19 1517  --  114Abnormal   14  132/75  --  95 %  None (Room air)  Sitting       Pertinent Labs/Diagnostic Test Results:   Results from last 7 days   Lab Units 12/15/19  0500 12/14/19  1724   WBC Thousand/uL 13 99* 23 24*   HEMOGLOBIN g/dL 14 0 15 6   HEMATOCRIT % 42 6 46 8   PLATELETS Thousands/uL 234 274   NEUTROS ABS Thousands/µL 10 64* 21 31*         Results from last 7 days   Lab Units 12/15/19  0500 12/14/19  1724   SODIUM mmol/L 144 137   POTASSIUM mmol/L 3 9 4 2   CHLORIDE mmol/L 109* 105   CO2 mmol/L 27 22   ANION GAP mmol/L 8 10   BUN mg/dL 11 15   CREATININE mg/dL 0 78 0 92   EGFR ml/min/1 73sq m 119 109   CALCIUM mg/dL 8 3 8 6     Results from last 7 days   Lab Units 12/14/19  1724   AST U/L 19   ALT U/L 39   ALK PHOS U/L 79   TOTAL PROTEIN g/dL 7 1   ALBUMIN g/dL 4 1   TOTAL BILIRUBIN mg/dL 0 46         Results from last 7 days   Lab Units 12/15/19  0500 12/14/19  1724   GLUCOSE RANDOM mg/dL 85 126       Results from last 7 days   Lab Units 12/14/19  2048   PROCALCITONIN ng/ml <0 05     Results from last 7 days   Lab Units 12/14/19  1822   LACTIC ACID mmol/L 0 8       Results from last 7 days   Lab Units 12/15/19  0900   STREP PNEUMONIAE ANTIGEN, URINE  Negative   LEGIONELLA URINARY ANTIGEN  Negative     Results from last 7 days   Lab Units 12/14/19  2151   AMPH/METH  Negative   BARBITURATE UR  Negative   BENZODIAZEPINE UR  Negative   COCAINE UR  Negative   METHADONE URINE  Negative   OPIATE UR  Positive*   PCP UR  Negative   THC UR  Negative       Results from last 7 days   Lab Units 12/14/19  1823 12/14/19  1822   BLOOD CULTURE  Received in Microbiology Lab  Culture in Progress  Received in Microbiology Lab  Culture in Progress  12/14 CTA CHEST:1   Findings suggestive of mild aspiration or pneumonia in the dependent portions of the right lung  2   Technically limited evaluation  No evidence of proximal pulmonary artery embolus  No thoracic aortic aneurysm or dissection      12/14 CXR:pending      ED Treatment:   Medication Administration from 12/14/2019 1506 to 12/14/2019 2113       Date/Time Order Dose Route Action                  12/14/2019 1554 sodium chloride 0 9 % bolus 1,000 mL 1,000 mL Intravenous New Bag                  12/14/2019 1727 sodium chloride 0 9 % bolus 1,000 mL 1,000 mL Intravenous New Bag       12/14/2019 1725 ondansetron (ZOFRAN) injection 4 mg 4 mg Intravenous Given       12/14/2019 1844 albuterol inhalation solution 5 mg 5 mg Nebulization Given       12/14/2019 1846 ipratropium (ATROVENT) 0 02 % inhalation solution 0 5 mg 0 5 mg Nebulization Given       12/14/2019 1938 iohexol (OMNIPAQUE) 350 MG/ML injection (MULTI-DOSE) 85 mL 85 mL Intravenous Given                  12/14/2019 2036 metroNIDAZOLE (FLAGYL) IVPB (premix) 500 mg 500 mg Intravenous New Bag          Past Medical History:   Diagnosis Date    Anxiety     PTSD (post-traumatic stress disorder)      Present on Admission:   Heroin overdose ([place] [address] )   Sepsis due to pneumonia ([place] [address] )   Tobacco dependence      Admitting Diagnosis: Cough [R05]  Leucocytosis [D72 829]  Aspiration pneumonia ([place] [address] ) [J69 0]  Tachycardia [R00 0]  Heroin overdose ([place] [address] ) [T40 1X1A]  Heroin overdose, accidental or unintentional, initial encounter ([place] [address] ) [T40 1X1A]  Age/Sex: 36 y o  male  Admission Orders:  Scheduled Medications:    Medications:  guaiFENesin 600 mg Oral BID   naloxone (FOR EMS ONLY) 2 each Does not apply Once   nicotine 1 patch Transdermal Daily     Continuous IV Infusions:    sodium chloride 75 mL/hr Intravenous Continuous     PRN Meds:    acetaminophen 650 mg Oral Q6H PRN 12/15 x1   aluminum-magnesium hydroxide-simethicone 30 mL Oral Q6H PRN   ondansetron 4 mg Intravenous Q6H PRN     Reg diet  Ambulate  Aspiration precautions  Tele    IP CONSULT TO CASE MANAGEMENT    Network Utilization Review Department  [email or web address]  ATTENTION: Please call with any questions or concerns to [phone] and carefully listen to the prompts so that you are directed to the right person  All voicemails are confidential   Irina Milner all requests for admission clinical reviews, approved or denied determinations and any other requests to dedicated fax number below belonging to the campus where the patient is receiving treatment   List of dedicated fax numbers for the Facilities:  [address] DENIALS (Administrative/Medical Necessity) [phone]   [address] [place] [address] (Maternity/NICU/Pediatrics) [phone]     [facility] [phone]     [address] [phone]   [facility] [phone]   [address] [phone]   [facility]  [phone]   Jessi Davila [address] [phone]

## 2019-12-15 NOTE — PLAN OF CARE
Problem: Potential for Falls  Goal: Patient will remain free of falls  Description  INTERVENTIONS:  - Assess patient frequently for physical needs  -  Identify cognitive and physical deficits and behaviors that affect risk of falls    -  [place] fall precautions as indicated by assessment   - Educate patient/family on patient safety including physical limitations  - Instruct patient to call for assistance with activity based on assessment  - Modify environment to reduce risk of injury  - Consider OT/PT consult to assist with strengthening/mobility  Outcome: Progressing     Problem: PAIN - ADULT  Goal: Verbalizes/displays adequate comfort level or baseline comfort level  Description  Interventions:  - Encourage patient to monitor pain and request assistance  - Assess pain using appropriate pain scale  - Administer analgesics based on type and severity of pain and evaluate response  - Implement non-pharmacological measures as appropriate and evaluate response  - Consider cultural and social influences on pain and pain management  - Notify physician/advanced practitioner if interventions unsuccessful or patient reports new pain  Outcome: Progressing     Problem: INFECTION - ADULT  Goal: Absence or prevention of progression during hospitalization  Description  INTERVENTIONS:  - Assess and monitor for signs and symptoms of infection  - Monitor lab/diagnostic results  - Monitor all insertion sites, i e  indwelling lines, tubes, and drains  - Monitor endotracheal if appropriate and nasal secretions for changes in amount and color  - Page appropriate cooling/warming therapies per order  - Administer medications as ordered  - Instruct and encourage patient and family to use good hand hygiene technique  - Identify and instruct in appropriate isolation precautions for identified infection/condition  Outcome: Progressing  Goal: Absence of fever/infection during neutropenic period  Description  INTERVENTIONS:  - Monitor WBC    Outcome: Progressing     Problem: SAFETY ADULT  Goal: Patient will remain free of falls  Description  INTERVENTIONS:  - Assess patient frequently for physical needs  -  Identify cognitive and physical deficits and behaviors that affect risk of falls    -  [place] fall precautions as indicated by assessment   - Educate patient/family on patient safety including physical limitations  - Instruct patient to call for assistance with activity based on assessment  - Modify environment to reduce risk of injury  - Consider OT/PT consult to assist with strengthening/mobility  Outcome: Progressing  Goal: Maintain or return to baseline ADL function  Description  INTERVENTIONS:  -  Assess patient's ability to carry out ADLs; assess patient's baseline for ADL function and identify physical deficits which impact ability to perform ADLs (bathing, care of mouth/teeth, toileting, grooming, dressing, etc )  - Assess/evaluate cause of self-care deficits   - Assess range of motion  - Assess patient's mobility; develop plan if impaired  - Assess patient's need for assistive devices and provide as appropriate  - Encourage maximum independence but intervene and supervise when necessary  - Involve family in performance of ADLs  - Assess for home care needs following discharge   - Consider OT consult to assist with ADL evaluation and planning for discharge  - Provide patient education as appropriate  Outcome: Progressing  Goal: Maintain or return mobility status to optimal level  Description  INTERVENTIONS:  - Assess patient's baseline mobility status (ambulation, transfers, stairs, etc )    - Identify cognitive and physical deficits and behaviors that affect mobility  - Identify mobility aids required to assist with transfers and/or ambulation (gait belt, sit-to-stand, lift, walker, cane, etc )  - [place] fall precautions as indicated by assessment  - Record patient progress and toleration of activity level on Mobility SBAR; progress patient to next Phase/Stage  - Instruct patient to call for assistance with activity based on assessment  - Consider rehabilitation consult to assist with strengthening/weightbearing, etc   Outcome: Progressing     Problem: DISCHARGE PLANNING  Goal: Discharge to home or other facility with appropriate resources  Description  INTERVENTIONS:  - Identify barriers to discharge w/patient and caregiver  - Arrange for needed discharge resources and transportation as appropriate  - Identify discharge learning needs (meds, wound care, etc )  - Arrange for interpretive services to assist at discharge as needed  - Refer to Case Management Department for coordinating discharge planning if the patient needs post-hospital services based on physician/advanced practitioner order or complex needs related to functional status, cognitive ability, or social support system  Outcome: Progressing     Problem: Knowledge Deficit  Goal: Patient/family/caregiver demonstrates understanding of disease process, treatment plan, medications, and discharge instructions  Description  Complete learning assessment and assess knowledge base    Interventions:  - Provide teaching at level of understanding  - Provide teaching via preferred learning methods  Outcome: Progressing

## 2019-12-15 NOTE — H&P
H&P- Tae Recinos 1985, 36 y o  male MRN: [id number]    Unit/Bed#: [facility] 2 -01 Encounter: [id number]    Primary Care Provider: No primary care provider on file  Date and time admitted to hospital: 12/14/2019  3:06 PM      * Sepsis due to pneumonia [facility])  Assessment & Plan  Meets sepsis criteria with leukocytosis and tachycardia; reports cough and SOB; denies fever  R/o bacterial vs viral vs pneumonitis  CT: Findings suggestive of mild aspiration or pneumonia in the dependent portions of the right lung  PCT ordered  Blood cx in progress  Given Flagyl in ED, will give one dose of IV Rocephin and hold further abx pending result of PCT   Add Mucinex  Urine antigens ordered    Heroin overdose ([facility] [address] )  Assessment & Plan  S/p Narcan  HOST referral    Tobacco dependence  Assessment & Plan  Smokes 1/2PPD, agreeable to Nicotine TD patch  Smoking cessation education    VTE Prophylaxis: score 2  / reason for no mechanical VTE prophylaxis ambulate   Code Status: FC  POLST: POLST is not applicable to this patient  Discussion with family:     Anticipated Length of Stay:  Patient will be admitted on an Observation basis with an anticipated length of stay of  < 2 midnights  Justification for Hospital Stay: sepsis vs sirs    Total Time for Visit, including Counseling / Coordination of Care: 45 minutes  Greater than 50% of this total time spent on direct patient counseling and coordination of care  Chief Complaint:   "can't breathe"    History of Present Illness:    Tae Recinos is a 36 y o  male who presents with heroin overdose s/p narcan  Patient irritable and providing limited history, states he snorts but today he injected heroin; has been snorting for the last 8 months  Reports SOB for 1 month and cough productive of sputum "in all colors " Did not provide information about sputum, states, "I said all colors " reports pleuritic chest pain with respiration and SOB with activity   Patient ill-tempered, unable to obtain further information  Review of Systems:    Review of Systems   Constitutional: Negative for fever  Respiratory: Positive for cough, chest tightness and shortness of breath  Cardiovascular: Negative  Gastrointestinal: Negative  Psychiatric/Behavioral: Negative for suicidal ideas  Past Medical and Surgical History:     Past Medical History:   Diagnosis Date    Anxiety     PTSD (post-traumatic stress disorder)        Past Surgical History:   Procedure Laterality Date    SHOULDER SURGERY Left        Meds/Allergies:    Prior to Admission medications    Medication Sig Start Date End Date Taking? Authorizing Provider   fluticasone Sukhluis carlos Ferguson) 50 mcg/act nasal spray 1 spray into each nostril daily 6/21/19 12/14/19  Preet Vickers MD     I have reviewed home medications with patient personally  Allergies: Allergies   Allergen Reactions    Morphine Other (See Comments)     combative-Daniel  Percocet       Social History:     Marital Status: Single   Occupation:   Patient Pre-hospital Living Situation: resides with a friend  Patient Pre-hospital Level of Mobility: ambulatory  Patient Pre-hospital Diet Restrictions:   Substance Use History:   Social History     Substance and Sexual Activity   Alcohol Use Not Currently     Social History     Tobacco Use   Smoking Status Current Every Day Smoker    Packs/day: 0 50   Smokeless Tobacco Never Used     Social History     Substance and Sexual Activity   Drug Use Yes    Types: Marijuana, Heroin       Family History:    History reviewed  No pertinent family history  Physical Exam:     Vitals:   Blood Pressure: 114/54 (12/14/19 2054)  Pulse: (!) 114 (12/14/19 2054)  Temperature: 97 8 °F (36 6 °C) (12/14/19 1557)  Temp Source: Oral (12/14/19 1557)  Respirations: 16 (12/14/19 2054)  SpO2: 94 % (12/14/19 2054)    Physical Exam   Constitutional: He is oriented to person, place, and time  He appears well-developed and well-nourished  No distress  Ill-tempered   HENT:   Head: Normocephalic and atraumatic  Tattoo of star under L eye   Eyes:   Kept his eyes closed   Neck: Neck supple  Cardiovascular: Regular rhythm and normal heart sounds  Tachycardia present  Pulmonary/Chest: Effort normal and breath sounds normal  No stridor  No respiratory distress  He has no wheezes  He has no rales  Abdominal: Soft  Bowel sounds are normal  He exhibits no distension and no mass  There is no tenderness  There is no guarding  Tattoos on abdomen   Musculoskeletal:   Multiple tattoos   Neurological: He is alert and oriented to person, place, and time  Skin: Skin is warm  He is diaphoretic  Psychiatric: Judgment and thought content normal    Irritable      Additional Data:     Lab Results: I have personally reviewed pertinent reports  Results from last 7 days   Lab Units 12/14/19  1724   WBC Thousand/uL 23 24*   HEMOGLOBIN g/dL 15 6   HEMATOCRIT % 46 8   PLATELETS Thousands/uL 274   NEUTROS PCT % 91*   LYMPHS PCT % 4*   MONOS PCT % 4   EOS PCT % 0     Results from last 7 days   Lab Units 12/14/19  1724   SODIUM mmol/L 137   POTASSIUM mmol/L 4 2   CHLORIDE mmol/L 105   CO2 mmol/L 22   BUN mg/dL 15   CREATININE mg/dL 0 92   ANION GAP mmol/L 10   CALCIUM mg/dL 8 6   ALBUMIN g/dL 4 1   TOTAL BILIRUBIN mg/dL 0 46   ALK PHOS U/L 79   ALT U/L 39   AST U/L 19   GLUCOSE RANDOM mg/dL 126                 Results from last 7 days   Lab Units 12/14/19  1822   LACTIC ACID mmol/L 0 8       Imaging: I have personally reviewed pertinent reports  CTA ED chest PE study   Final Result by Jemima Pedroza MD (12/14 2000)         1  Findings suggestive of mild aspiration or pneumonia in the dependent portions of the right lung  2   Technically limited evaluation  No evidence of proximal pulmonary artery embolus  No thoracic aortic aneurysm or dissection                    Workstation performed: [id number]         XR chest 2 views    (Results Pending) EKG, Pathology, and Other Studies Reviewed on Admission:   · CT    Allscripts / Epic Records Reviewed: Yes     ** Please Note: This note has been constructed using a voice recognition system   **

## 2019-12-15 NOTE — PROGRESS NOTES
Progress Note - Frankie Kuo 36 y o  male MRN: [id number]    Unit/Bed#: [facility] 2 -01 Encounter: [id number]    Assessment/Plan:    Acute respiratory distress  related to pneumonia/aspiration continue Mucinex antibiotics now stable on room air    Heroin abuse    claims this is 1 time after being clean for many months will not do heroin again, monitor for signs of withdrawal    Tobacco abuse   cessation counseling provided    Sepsis    related to pneumonia versus aspiration pneumonitis continue antibiotics    Heroin overdose   patient is now wake alert orient x3 appropriate    Subjective:   Still with cough shortness of breath chest tightness with cough cough with thick sputum no nausea vomiting diarrhea no fevers chills appetite good    Objective:     Vitals: Blood pressure 119/64, pulse 61, temperature 97 8 °F (36 6 °C), resp  rate 14, SpO2 97 %  ,There is no height or weight on file to calculate BMI        Results from last 7 days   Lab Units 12/15/19  0500   WBC Thousand/uL 13 99*   HEMOGLOBIN g/dL 14 0   HEMATOCRIT % 42 6   PLATELETS Thousands/uL 234     Results from last 7 days   Lab Units 12/15/19  0500 12/14/19  1724   POTASSIUM mmol/L 3 9 4 2   CHLORIDE mmol/L 109* 105   CO2 mmol/L 27 22   BUN mg/dL 11 15   CREATININE mg/dL 0 78 0 92   CALCIUM mg/dL 8 3 8 6   ALK PHOS U/L  --  79   ALT U/L  --  39   AST U/L  --  19       Scheduled Meds:    Current Facility-Administered Medications:  acetaminophen 650 mg Oral Q6H PRN Rue Million, CRNP   aluminum-magnesium hydroxide-simethicone 30 mL Oral Q6H PRN Rue Million, CRNP   guaiFENesin 600 mg Oral BID Rue Million, CRNP   naloxone (FOR EMS ONLY) 2 each Does not apply Once Thiago Roe MD   nicotine 1 patch Transdermal Daily Rue Million, CRNP   ondansetron 4 mg Intravenous Q6H PRN Rue Million, CRNP       Continuous Infusions:     Physical exam:  General appearance:  Alert no distress interaction appropriate  Head/Eyes:  Nonicteric PERRL EOMI  Neck:  Supple  Lungs:  Right base crackles decreased breath sounds  Heart: normal S1 S2 regular  Abdomen: Soft nontender with bowel sounds  Extremities: no edema  Skin: no rash    Invasive Devices     Peripheral Intravenous Line            Peripheral IV 12/14/19 Left Forearm less than 1 day    Peripheral IV 12/14/19 Left Hand less than 1 day                  Counseling / Coordination of Care  Total floor / unit time spent today 30  minutes  Greater than 50% of total time was spent with the patient and / or family counseling and / or coordination of care    A description of the counseling / coordination of care:

## 2019-12-15 NOTE — ASSESSMENT & PLAN NOTE
Meets sepsis criteria with leukocytosis and tachycardia; reports cough and SOB; denies fever  R/o bacterial vs viral vs pneumonitis  CT: Findings suggestive of mild aspiration or pneumonia in the dependent portions of the right lung    PCT ordered  Blood cx in progress  Given Flagyl in ED, will give one dose of IV Rocephin and hold further abx pending result of PCT   Add Mucinex  Urine antigens ordered

## 2019-12-16 VITALS
OXYGEN SATURATION: 100 % | RESPIRATION RATE: 18 BRPM | SYSTOLIC BLOOD PRESSURE: 112 MMHG | DIASTOLIC BLOOD PRESSURE: 78 MMHG | HEART RATE: 84 BPM | TEMPERATURE: 98.2 F

## 2019-12-16 PROCEDURE — 94640 AIRWAY INHALATION TREATMENT: CPT

## 2019-12-16 PROCEDURE — 99239 HOSP IP/OBS DSCHRG MGMT >30: CPT | Performed by: HOSPITALIST

## 2019-12-16 RX ORDER — IPRATROPIUM BROMIDE AND ALBUTEROL SULFATE 2.5; .5 MG/3ML; MG/3ML
3 SOLUTION RESPIRATORY (INHALATION)
Status: DISCONTINUED | OUTPATIENT
Start: 2019-12-16 | End: 2019-12-16 | Stop reason: HOSPADM

## 2019-12-16 RX ORDER — AMOXICILLIN AND CLAVULANATE POTASSIUM 875; 125 MG/1; MG/1
1 TABLET, FILM COATED ORAL EVERY 12 HOURS SCHEDULED
Qty: 14 TABLET | Refills: 0 | Status: SHIPPED | OUTPATIENT
Start: 2019-12-16 | End: 2019-12-23

## 2019-12-16 RX ADMIN — GUAIFENESIN 600 MG: 600 TABLET ORAL at 09:52

## 2019-12-16 RX ADMIN — NICOTINE 1 PATCH: 14 PATCH TRANSDERMAL at 09:51

## 2019-12-16 RX ADMIN — IPRATROPIUM BROMIDE AND ALBUTEROL SULFATE 3 ML: 2.5; .5 SOLUTION RESPIRATORY (INHALATION) at 13:39

## 2019-12-16 RX ADMIN — IPRATROPIUM BROMIDE AND ALBUTEROL SULFATE 3 ML: 2.5; .5 SOLUTION RESPIRATORY (INHALATION) at 07:39

## 2019-12-16 NOTE — UTILIZATION REVIEW
Notification of Inpatient Admission/Inpatient Authorization Request   This is a Notification of Inpatient Admission for [address]  Be advised that this patient was admitted to our facility under Inpatient Status  Contact Arleth Tonie at [phone] for additional admission information  [facility]  DEDICATED [phone]  Patient Name:   Harvey Krabbe   YOB: 1985       [address]   [address]:   [facility]  Tax ID: [id number]  NPI: [id number] Attending Provider/NPI: Kearny Grade [[id number]]   Place of Service Code: 24     Place of Service Name:  [address] [place] [address]   Start Date: 12/15/19 1051     Discharge Date & Time: No discharge date for patient encounter  Type of Admission: Inpatient Status Discharge Disposition   (if discharged): Home/Self Care   Patient Diagnoses: Cough [R05]  Leucocytosis [D72 829]  Aspiration pneumonia ([facility] [address] ) [J69 0]  Tachycardia [R00 0]  Heroin overdose ([facility] ) [T40 1X1A]  Heroin overdose, accidental or unintentional, initial encounter [facility]) [T40 1X1A]     Orders: Admission Orders (From admission, onward)     Ordered        12/15/19 1051  Inpatient Admission  Once         12/14/19 2047  Place in Observation  Once                    Assigned Utilization Review Contact: [address] [place] [address] Utilization Review Department  Phone: [phone]; Fax [phone]  Email: [email or web address]   ATTENTION PAYERS: Please call the assigned Utilization  directly with any questions or concerns ALL voicemails in the department are confidential  Send all requests for admission clinical reviews, approved or denied determinations and any other requests to dedicated fax number belonging to the campus where the patient is receiving treatment

## 2019-12-16 NOTE — SOCIAL WORK
Pt admitted after heroin use w/ resulting asp pna  Pt is medically cleared for d/c  Consulted re: drug use with pt having a h/o drug rehab years aago- denies current need for same stating he was clean for 8 months prior to this "slip up"  Pt was staying in a Recovery House and can not return until he is again clean for a period of time  He will d/c to his girlfriend's house with her to transport him home  Pt will obtain prescriptions from [facility] [address]  No further d/c needs identified

## 2019-12-16 NOTE — NURSING NOTE
Pt was given discharge instructions - denied any questions  IV was dc'd   Pt ambulated to vehicle escorted by family

## 2019-12-16 NOTE — DISCHARGE SUMMARY
Discharge- Dwayne Diego 1985, 36 y o  male MRN: [id number]    Unit/Bed#: [facility] 2 -01 Encounter: [id number]    Primary Care Provider: No primary care provider on file  Date and time admitted to hospital: 12/14/2019  3:06 PM        Tobacco dependence  Assessment & Plan    Smoking cessation education    Heroin overdose [facility])  Assessment & Plan  Received Narcan  He refuses HOST referral  He contracts for safety    * Sepsis due to pneumonia [facility])  Assessment & Plan  Quickly improved  He is on room air and much less short of breath  I will send him home on a course of Augmentin        Discharging Physician / Practitioner: Gabriela Gao DO  PCP: No primary care provider on file  Admission Date:   Admission Orders (From admission, onward)     Ordered        12/15/19 1051  Inpatient Admission  Once         12/14/19 2047  Place in Observation  Once                   Discharge Date: 12/16/19    Resolved Problems  Date Reviewed: 12/14/2019    None              Reason for Admission: shortness of breath      Hospital Course:     Dwayne Diego is a 36 y o  male patient who originally presented to the hospital on 12/14/2019 due to shortness of breath  Had a recent heroin overdose  He was felt to have sepsis with pneumonia, likely aspiration  He was treated with empiric antibiotics  He very quickly improved  He is on room air oxygen ambulating without difficulty  He is anxious to go home  I will send him home with a course of Augmentin  Social work did see the patient and offer heroin counseling  But patient refuses  He states he can quit on his own and will do illicit drugs any more  Please see above list of diagnoses and related plan for additional information         Condition at Discharge: good       Discharge Day Visit / Exam:     Subjective:  Feels better  Less sob  Less cough      Vitals: Blood Pressure: 112/78 (12/16/19 1503)  Pulse: 84 (12/16/19 1503)  Temperature: 98 2 °F (36 8 °C) (12/16/19 1503)  Temp Source: Oral (12/16/19 0000)  Respirations: 18 (12/16/19 1503)  SpO2: 100 % (12/16/19 1503)    Exam:     Physical Exam   HENT:   Head: Normocephalic and atraumatic  Eyes: Pupils are equal, round, and reactive to light  EOM are normal    Cardiovascular: Normal rate and regular rhythm  Exam reveals no gallop and no friction rub  No murmur heard  Pulmonary/Chest: Effort normal and breath sounds normal  He has no wheezes  He has no rales  Abdominal: Soft  Bowel sounds are normal  There is no tenderness  Musculoskeletal: He exhibits no edema  Nursing note and vitals reviewed            Discharge instructions/Information to patient and family:   See after visit summary for information provided to patient and family  Provisions for Follow-Up Care:  See after visit summary for information related to follow-up care and any pertinent home health orders  Disposition:     Home       Discharge Statement:  I spent 36 minutes discharging the patient  This time was spent on the day of discharge  I had direct contact with the patient on the day of discharge  Greater than 50% of the total time was spent examining patient, answering all patient questions, arranging and discussing plan of care with patient as well as directly providing post-discharge instructions  Additional time then spent on discharge activities  Discharge Medications:  See after visit summary for reconciled discharge medications provided to patient and family        ** Please Note: This note has been constructed using a voice recognition system **

## 2019-12-16 NOTE — ASSESSMENT & PLAN NOTE
Quickly improved  He is on room air and much less short of breath    I will send him home on a course of Augmentin

## 2019-12-18 LAB
BACTERIA SPT RESP CULT: ABNORMAL
GRAM STN SPEC: ABNORMAL

## 2019-12-19 LAB
BACTERIA BLD CULT: NORMAL
BACTERIA BLD CULT: NORMAL

## 2020-03-23 ENCOUNTER — APPOINTMENT (EMERGENCY)
Dept: RADIOLOGY | Facility: HOSPITAL | Age: 35
End: 2020-03-23
Payer: COMMERCIAL

## 2020-03-23 ENCOUNTER — HOSPITAL ENCOUNTER (EMERGENCY)
Facility: HOSPITAL | Age: 35
Discharge: HOME | End: 2020-03-23
Attending: EMERGENCY MEDICINE | Admitting: EMERGENCY MEDICINE
Payer: COMMERCIAL

## 2020-03-23 VITALS
RESPIRATION RATE: 16 BRPM | WEIGHT: 203.04 LBS | SYSTOLIC BLOOD PRESSURE: 154 MMHG | OXYGEN SATURATION: 96 % | HEART RATE: 112 BPM | DIASTOLIC BLOOD PRESSURE: 89 MMHG | TEMPERATURE: 96.3 F

## 2020-03-23 DIAGNOSIS — M25.512 LEFT SHOULDER PAIN, UNSPECIFIED CHRONICITY: ICD-10-CM

## 2020-03-23 DIAGNOSIS — T40.1X1A ACCIDENTAL OVERDOSE OF HEROIN, INITIAL ENCOUNTER (HCC): Primary | ICD-10-CM

## 2020-03-23 PROCEDURE — 99285 EMERGENCY DEPT VISIT HI MDM: CPT

## 2020-03-23 PROCEDURE — 73030 X-RAY EXAM OF SHOULDER: CPT

## 2020-03-23 PROCEDURE — 99285 EMERGENCY DEPT VISIT HI MDM: CPT | Performed by: EMERGENCY MEDICINE

## 2020-03-23 PROCEDURE — 96374 THER/PROPH/DIAG INJ IV PUSH: CPT

## 2020-03-23 RX ORDER — NALOXONE HYDROCHLORIDE 1 MG/ML
2 INJECTION INTRAMUSCULAR; INTRAVENOUS; SUBCUTANEOUS ONCE
Status: COMPLETED | OUTPATIENT
Start: 2020-03-23 | End: 2020-03-23

## 2020-03-23 RX ORDER — NALOXONE HYDROCHLORIDE 1 MG/ML
2 INJECTION INTRAMUSCULAR; INTRAVENOUS; SUBCUTANEOUS ONCE
Status: DISCONTINUED | OUTPATIENT
Start: 2020-03-23 | End: 2020-03-23 | Stop reason: HOSPADM

## 2020-03-23 RX ADMIN — NALOXONE HYDROCHLORIDE 2 MG: 1 INJECTION PARENTERAL at 10:44

## 2020-03-23 NOTE — ED NOTES
This RN called patient twice to come back to triage, friend stated he was in the bathroom  Friend left ER  Third call for this patient and no response  This RN asked Richard in registration to call security to check the bathroom, security is backed up  This RN went to bathroom and knocked on door twice no response, bathroom unlocked  Patient was laying down supine, unresponsive, purple in color, no respirations  This RN called for help,  grabbed BVM and immediately started bagging patient  Physician and nurses came to the bedside, narcan given intransal, continous bagging  After 2nd dose of narcan, patient regained consciousness, with some confusion  He was able to stand with assistance and placed on stretcher  Taken to an exam room immediately        Uziel Oquendo RN  03/23/20 1037

## 2020-03-23 NOTE — ED PROVIDER NOTES
History  Chief Complaint   Patient presents with    Overdose - Accidental     pt found in bathroom in waiting room, pt reports using 1 bag IN  Pt given 4mg narcan IN    Shoulder Pain     pt c/o left sided shoulder pain since yesterday, pt denies injury at that time however states an injury years ago which required surgery  pt describes as "I feel like it popped out"       History provided by:  Patient  Overdose - Accidental   Ingested substance:  Illicit drugs  Illicit drug type: Other (heroin )  [facility] [address] [facility]   Context: recreational    Associated symptoms: altered mental status    Associated symptoms: no abdominal pain, no chest pain, no cough, no diarrhea, no headaches, no nausea and no shortness of breath        None       Past Medical History:   Diagnosis Date    Anxiety     PTSD (post-traumatic stress disorder)        Past Surgical History:   Procedure Laterality Date    SHOULDER SURGERY Left        History reviewed  No pertinent family history  I have reviewed and agree with the history as documented  E-Cigarette/Vaping     E-Cigarette/Vaping Substances     Social History     Tobacco Use    Smoking status: Current Every Day Smoker     Packs/day: 1 00     Types: Cigarettes    Smokeless tobacco: Never Used   Substance Use Topics    Alcohol use: Not Currently    Drug use: Yes     Types: Marijuana, Heroin       Review of Systems   Constitutional: Negative for chills and fever  HENT: Negative for rhinorrhea, sore throat and trouble swallowing  Eyes: Negative for pain  Respiratory: Negative for cough, shortness of breath, wheezing and stridor  Cardiovascular: Negative for chest pain and leg swelling  Gastrointestinal: Negative for abdominal pain, diarrhea and nausea  Endocrine: Negative for polyuria  Genitourinary: Negative for dysuria, flank pain and urgency  Musculoskeletal: Negative for joint swelling, myalgias and neck stiffness     Skin: Negative for rash    Allergic/Immunologic: Negative for immunocompromised state  Neurological: Negative for dizziness, syncope, weakness, numbness and headaches  Psychiatric/Behavioral: Negative for confusion and suicidal ideas  All other systems reviewed and are negative  Physical Exam  Physical Exam   Constitutional: He is oriented to person, place, and time  Pinpoint pupils   HENT:   Head: Normocephalic and atraumatic  Eyes: Pupils are equal, round, and reactive to light  EOM are normal    Neck: Normal range of motion  Neck supple  Cardiovascular: Normal rate and regular rhythm  Exam reveals no friction rub  No murmur heard  Pulmonary/Chest: Breath sounds normal  No respiratory distress  He has no wheezes  He has no rales  Abdominal: Soft  Bowel sounds are normal  He exhibits no distension  There is no tenderness  Musculoskeletal: Normal range of motion  He exhibits no edema or tenderness  Neurological: He is alert and oriented to person, place, and time  Patient feels groggy able to ambulate after Narcan given  Skin: Skin is warm  No rash noted  Psychiatric: He has a normal mood and affect  Nursing note and vitals reviewed        Vital Signs  ED Triage Vitals [03/23/20 1029]   Temperature Pulse Respirations Blood Pressure SpO2   (!) 96 3 °F (35 7 °C) (!) 128 16 164/76 95 %      Temp Source Heart Rate Source Patient Position - Orthostatic VS BP Location FiO2 (%)   Tympanic Monitor Sitting Left arm --      Pain Score       --           Vitals:    03/23/20 1029 03/23/20 1102 03/23/20 1111   BP: 164/76  154/89   Pulse: (!) 128 (!) 111 (!) 112   Patient Position - Orthostatic VS: Sitting  Lying         Visual Acuity  Visual Acuity      Most Recent Value   L Pupil Size (mm)  2   R Pupil Size (mm)  2          ED Medications  Medications   naloxone (NARCAN) injection 2 mg (2 mg Intravenous Given 3/23/20 1044)   naloxone (NARCAN) injection 2 mg (2 mg Intravenous Given 3/23/20 1044)       Diagnostic Studies  Results Reviewed     None                 XR shoulder 2+ views LEFT   Final Result by Olviia Vega MD (03/23 1132)      No acute osseous abnormality  Workstation performed: [id number]                    Procedures  CriticalCare Time  Performed by: Nba Gonzalez DO  Authorized by: Nba Gonzalez DO     Critical care provider statement:     Critical care time (minutes):  60    Critical care time was exclusive of:  Separately billable procedures and treating other patients and teaching time    Critical care was necessary to treat or prevent imminent or life-threatening deterioration of the following conditions:  Toxidrome    Critical care was time spent personally by me on the following activities:  Blood draw for specimens, obtaining history from patient or surrogate, development of treatment plan with patient or surrogate, evaluation of patient's response to treatment, examination of patient, ordering and performing treatments and interventions, ordering and review of laboratory studies, ordering and review of radiographic studies, re-evaluation of patient's condition and review of old charts  Comments:      Narcan 4mg given, ambu bagged in the [facility] bathroom, overdose and altered mental state  Awoke with narcan given                ED Course  ED Course as of Mar 23 1551   Mon Mar 23, 2020   1035 Patient was found overdosed in the emergency department waiting room bathroom he was given intranasal Narcan 4 mg and came around he was bagged appropriately by triage nurse prior to my assessment  He did not lose a pulse there is no Super CPR that was needed  Patient is awake at this point time will give another dose of Narcan patient is acting a little bit sleepy in the emergency department this point time but he arouses to voice and stimulation he also has some pinpoint pupils    The plan will be for observation in the emergency department at this point time until he is clinically slow bur sober                                    MDM  Number of Diagnoses or Management Options  Accidental overdose of heroin, initial encounter [facility]): new and requires workup  Left shoulder pain, unspecified chronicity: new and requires workup  Diagnosis management comments: 81-year-old male presents emergency department for noted shoulder pain presents with noted symptoms of overdose in the waiting room at this point time the patient was revived with Narcan ambulated and evaluated the emergency department x-rays negative plan outpatient management follow-up given strict instructions when to return back to the emergency department  Patient was observed in the emergency department after receiving pre-hospital naloxone  The patient was medically stable for discharge after a period of 1 hour of observation  Patient was deemed low risk for adverse events after naloxone administration because they met following six criteria based upon Hospital Observation Upon Reversal(HOUR) rule:    Can mobilize as usual  Have a normal [id number] (>95%)  Have a normal respiratory rate (>10 and <20 breaths/min)  Have a normal temperature (>35 0 and <37 5°C)  Have a normal heart rate (>50 and <100 beats/min)  Have a GCS score of 15    [employer] [facility]  2019 Jan;26(1):7-15  doi: 10 1111/acem  [id number]  Epub 2018 Dec 28           Amount and/or Complexity of Data Reviewed  Tests in the radiology section of CPT®: ordered and reviewed  Review and summarize past medical records: yes          Disposition  Final diagnoses:   Accidental overdose of heroin, initial encounter ([facility] )   Left shoulder pain, unspecified chronicity     Time reflects when diagnosis was documented in both MDM as applicable and the Disposition within this note     Time User Action Codes Description Comment    3/23/2020 11:49 AM Lucrecia Scott Add [T40 1X1A] Accidental overdose of heroin, initial encounter ([facility] [address] )     3/23/2020 11:49 AM Lucrecia Scott Add [M25 512] Left shoulder pain, unspecified chronicity       ED Disposition     ED Disposition Condition Date/Time Comment    Discharge Stable Mon Mar 23, 2020 11:49 AM Rhoda Ramirez discharge to home/self care  Follow-up Information    None         There are no discharge medications for this patient  No discharge procedures on file      PDMP Review     None          ED Provider  Electronically Signed by           Gabino Grubbs DO  03/23/20 0836

## 2020-03-24 ENCOUNTER — HOSPITAL ENCOUNTER (EMERGENCY)
Facility: HOSPITAL | Age: 35
Discharge: HOME | End: 2020-03-24
Attending: EMERGENCY MEDICINE | Admitting: EMERGENCY MEDICINE
Payer: COMMERCIAL

## 2020-03-24 VITALS
RESPIRATION RATE: 20 BRPM | SYSTOLIC BLOOD PRESSURE: 117 MMHG | HEART RATE: 81 BPM | OXYGEN SATURATION: 95 % | TEMPERATURE: 98 F | WEIGHT: 190 LBS | DIASTOLIC BLOOD PRESSURE: 63 MMHG

## 2020-03-24 VITALS
HEART RATE: 117 BPM | RESPIRATION RATE: 18 BRPM | TEMPERATURE: 98.3 F | OXYGEN SATURATION: 95 % | SYSTOLIC BLOOD PRESSURE: 121 MMHG | DIASTOLIC BLOOD PRESSURE: 81 MMHG

## 2020-03-24 DIAGNOSIS — M25.512 LEFT SHOULDER PAIN: ICD-10-CM

## 2020-03-24 DIAGNOSIS — F41.9 ANXIETY: Primary | ICD-10-CM

## 2020-03-24 DIAGNOSIS — F19.10 DRUG ABUSE (HCC): ICD-10-CM

## 2020-03-24 DIAGNOSIS — R45.851 SUICIDAL IDEATION: ICD-10-CM

## 2020-03-24 DIAGNOSIS — T40.1X1A ACCIDENTAL OVERDOSE OF HEROIN, INITIAL ENCOUNTER (HCC): Primary | ICD-10-CM

## 2020-03-24 LAB
AMPHETAMINES SERPL QL SCN: POSITIVE
BARBITURATES UR QL: NEGATIVE
BENZODIAZ UR QL: NEGATIVE
COCAINE UR QL: NEGATIVE
ETHANOL EXG-MCNC: 0 MG/DL
METHADONE UR QL: NEGATIVE
OPIATES UR QL SCN: POSITIVE
PCP UR QL: NEGATIVE
THC UR QL: NEGATIVE

## 2020-03-24 PROCEDURE — 99284 EMERGENCY DEPT VISIT MOD MDM: CPT

## 2020-03-24 PROCEDURE — 99284 EMERGENCY DEPT VISIT MOD MDM: CPT | Performed by: EMERGENCY MEDICINE

## 2020-03-24 PROCEDURE — 99285 EMERGENCY DEPT VISIT HI MDM: CPT | Performed by: EMERGENCY MEDICINE

## 2020-03-24 PROCEDURE — 99285 EMERGENCY DEPT VISIT HI MDM: CPT

## 2020-03-24 PROCEDURE — 82075 ASSAY OF BREATH ETHANOL: CPT | Performed by: EMERGENCY MEDICINE

## 2020-03-24 PROCEDURE — 80307 DRUG TEST PRSMV CHEM ANLYZR: CPT | Performed by: EMERGENCY MEDICINE

## 2020-03-24 RX ORDER — HYDROXYZINE HYDROCHLORIDE 25 MG/1
25 TABLET, FILM COATED ORAL ONCE
Status: COMPLETED | OUTPATIENT
Start: 2020-03-24 | End: 2020-03-24

## 2020-03-24 RX ORDER — NAPROXEN 250 MG/1
500 TABLET ORAL ONCE
Status: COMPLETED | OUTPATIENT
Start: 2020-03-24 | End: 2020-03-24

## 2020-03-24 RX ORDER — ONDANSETRON 4 MG/1
4 TABLET, ORALLY DISINTEGRATING ORAL ONCE
Status: COMPLETED | OUTPATIENT
Start: 2020-03-24 | End: 2020-03-24

## 2020-03-24 RX ORDER — HYDROXYZINE HYDROCHLORIDE 25 MG/1
25 TABLET, FILM COATED ORAL EVERY 6 HOURS
Qty: 12 TABLET | Refills: 0 | Status: SHIPPED | OUTPATIENT
Start: 2020-03-24

## 2020-03-24 RX ORDER — NAPROXEN 500 MG/1
500 TABLET ORAL 2 TIMES DAILY WITH MEALS
Qty: 30 TABLET | Refills: 0 | Status: SHIPPED | OUTPATIENT
Start: 2020-03-24

## 2020-03-24 RX ADMIN — ONDANSETRON 4 MG: 4 TABLET, ORALLY DISINTEGRATING ORAL at 21:10

## 2020-03-24 RX ADMIN — NAPROXEN 500 MG: 250 TABLET ORAL at 14:19

## 2020-03-24 RX ADMIN — HYDROXYZINE HYDROCHLORIDE 25 MG: 25 TABLET ORAL at 14:19

## 2020-03-24 NOTE — ED NOTES
Pt now making suicidal statements to Dr Thaddeus Duque and Rn; pt states he is suicidal without a specific plan; pt denies HI/AH/VH  Pt moved to behavior health section rm  [address], RN  03/24/20 2861

## 2020-03-24 NOTE — ED NOTES
Pt is a 30 y o  male who was brought to the ED with   Chief Complaint   Patient presents with    Anxiety     Patient reporting anxiety, states "I'm not feeling right " "My anxiety is through the roof " Patient states he feels lightheaded with chest tightness   Shoulder Pain     Patient states he fell 2 days ago, states he tripped in a ditch and injured his L shoulder   Suicidal     pt now states that he is SI without a specific plan; pt denies HI, pt denies AH/VH   Pt brought to the ED with complaints of increased axniety, increased depression, pt is unable to state what making him feel this way, Pt reports that he is making impulsive decisions, pt reports that he used heroin (1 bag IV on 3/23/2020) pt reports that he uses it occassionaly  Pt report that he feels unsafe but is unable to say why  Pt wanted to get result from [facility] (+ for meth and opiates) pt now is denying S/I H/I,A/H,V/H  Intake Assessment completed, Safety risk Assessment completed  CW met with pt and discussed the process of a BHU admission, verse a admission to a D&A detox/rehab  Pt continues to deny S/I,H/I,A/H,V/H, Pt does not want D&A treatment  Pt reports that he does now feel safe going home  CW discussed this case and pt plan  with ED Physician who is in agreement with this plan  Pt will be discharged per ED Physician, Pt refused any and all referrals from [address] [employer] [address] Worker

## 2020-03-24 NOTE — ED PROVIDER NOTES
History  Chief Complaint   Patient presents with    Anxiety     Patient reporting anxiety, states "I'm not feeling right " "My anxiety is through the roof " Patient states he feels lightheaded with chest tightness   Shoulder Pain     Patient states he fell 2 days ago, states he tripped in a ditch and injured his L shoulder   Suicidal     pt now states that he is SI without a specific plan; pt denies HI, pt denies AH/VH       History provided by:  Patient   used: No    Anxiety   Presenting symptoms: suicidal thoughts    Presenting symptoms: no agitation    Presenting symptoms comment:  Anxiety  Degree of incapacity (severity):   Moderate  Onset quality:  Unable to specify  Timing:  Unable to specify  Progression:  Unable to specify  Chronicity:  New  Context: drug abuse    Context comment:  Admits to Meth use  Treatment compliance:  Untreated  Relieved by:  Nothing  Worsened by:  Nothing  Ineffective treatments:  None tried  Associated symptoms: anxiety    Associated symptoms: no abdominal pain, no chest pain and no headaches    Risk factors: hx of mental illness    Shoulder Pain   Location:  Shoulder  Shoulder location:  L shoulder  Injury: yes (possible, had negative XR 2 days back at [facility])    Mechanism of injury: fall    Pain details:     Quality:  Aching    Radiates to:  Does not radiate    Severity:  Moderate    Onset quality:  Gradual    Duration:  2 days    Timing:  Intermittent    Progression:  Waxing and waning  Dislocation: no    Foreign body present:  Unable to specify  Prior injury to area:  Unable to specify  Relieved by:  Nothing  Worsened by:  Nothing  Ineffective treatments:  NSAIDs  Associated symptoms: no back pain, no fever, no neck pain, no stiffness and no swelling    Risk factors comment:  Drug abuse      None       Past Medical History:   Diagnosis Date    Anxiety     PTSD (post-traumatic stress disorder)        Past Surgical History:   Procedure Laterality Date    SHOULDER SURGERY Left        History reviewed  No pertinent family history  I have reviewed and agree with the history as documented  E-Cigarette/Vaping     E-Cigarette/Vaping Substances     Social History     Tobacco Use    Smoking status: Current Every Day Smoker     Packs/day: 1 00     Types: Cigarettes    Smokeless tobacco: Never Used   Substance Use Topics    Alcohol use: Not Currently    Drug use: Yes     Types: Methamphetamines     Comment: hx of heroin and marijuana, states last meth use 3 days ago       Review of Systems   Constitutional: Negative for activity change, chills and fever  HENT: Negative for facial swelling, sore throat and trouble swallowing  Eyes: Negative for pain and visual disturbance  Respiratory: Negative for cough, chest tightness and shortness of breath  Cardiovascular: Negative for chest pain and leg swelling  Gastrointestinal: Negative for abdominal pain, blood in stool, diarrhea, nausea and vomiting  Genitourinary: Negative for dysuria and flank pain  Musculoskeletal: Negative for back pain, neck pain, neck stiffness and stiffness  Skin: Negative for pallor and rash  Allergic/Immunologic: Negative for environmental allergies and immunocompromised state  Neurological: Negative for dizziness and headaches  Hematological: Negative for adenopathy  Does not bruise/bleed easily  Psychiatric/Behavioral: Positive for suicidal ideas  Negative for agitation and behavioral problems  The patient is nervous/anxious  All other systems reviewed and are negative  Physical Exam  Physical Exam   Constitutional: He is oriented to person, place, and time  He appears well-developed and well-nourished  No distress  HENT:   Head: Normocephalic and atraumatic  Eyes: EOM are normal    Neck: Normal range of motion  Neck supple  Cardiovascular: Normal rate, regular rhythm, normal heart sounds and intact distal pulses     Pulmonary/Chest: Effort normal and breath sounds normal    Abdominal: Soft  Bowel sounds are normal  There is no tenderness  There is no rebound and no guarding  Musculoskeletal: Normal range of motion  He exhibits no deformity  No left shoulder deformity, intact ROM, NV intact distally   Neurological: He is alert and oriented to person, place, and time  Skin: Skin is warm and dry  Psychiatric: He has a normal mood and affect  Nursing note and vitals reviewed  Vital Signs  ED Triage Vitals   Temperature Pulse Respirations Blood Pressure SpO2   03/24/20 1316 03/24/20 1314 03/24/20 1314 03/24/20 1314 03/24/20 1314   98 3 °F (36 8 °C) (!) 117 18 121/81 95 %      Temp Source Heart Rate Source Patient Position - Orthostatic VS BP Location FiO2 (%)   03/24/20 1316 03/24/20 1314 03/24/20 1314 03/24/20 1314 --   Temporal Monitor Sitting Right arm       Pain Score       --                  Vitals:    03/24/20 1314   BP: 121/81   Pulse: (!) 117   Patient Position - Orthostatic VS: Sitting         Visual Acuity      ED Medications  Medications   hydrOXYzine HCL (ATARAX) tablet 25 mg (25 mg Oral Given 3/24/20 1419)   naproxen (NAPROSYN) tablet 500 mg (500 mg Oral Given 3/24/20 1419)       Diagnostic Studies  Results Reviewed     Procedure Component Value Units Date/Time    Rapid drug screen, urine [[id number]]  (Abnormal) Collected:  03/24/20 1445    Lab Status:  Final result Specimen:  Urine, Clean Catch Updated:  03/24/20 1507     Amph/Meth UR Positive     Barbiturate Ur Negative     Benzodiazepine Urine Negative     Cocaine Urine Negative     Methadone Urine Negative     Opiate Urine Positive     PCP Ur Negative     THC Urine Negative    Narrative:       Presumptive report  If requested, specimen will be sent to reference lab for confirmation  FOR MEDICAL PURPOSES ONLY  IF CONFIRMATION NEEDED PLEASE CONTACT THE LAB WITHIN 5 DAYS      Drug Screen Cutoff Levels:  AMPHETAMINE/METHAMPHETAMINES  1000 ng/mL  BARBITURATES     200 ng/mL  BENZODIAZEPINES     200 ng/mL  COCAINE      300 ng/mL  METHADONE      300 ng/mL  OPIATES      300 ng/mL  PHENCYCLIDINE     25 ng/mL  THC       50 ng/mL      POCT alcohol breath test [[id number]]  (Normal) Resulted:  03/24/20 1411    Lab Status:  Final result Updated:  03/24/20 1411     EXTBreath Alcohol 0 00                 No orders to display              Procedures  Procedures         ED Course  ED Course as of Mar 24 1517   Tue Mar 24, 2020   1439 POC BAT noted  EXTBreath Alcohol: 0 00   1509 Patient seen by Crisis Worker, no criteria for 201/302, patient just wanted to know his drug screen test; now wants to go home  [address] [place] [address](!): Positive   1511 Urine drug screen noted  OPIATE URINE(!): Positive                                 [facility]  Number of Diagnoses or Management Options  Anxiety:   Drug abuse [facility]): new and requires workup  Left shoulder pain:   Suicidal ideation:   Diagnosis management comments: Patient is a 73-year-old male, history of drug use, comes in with complaints of anxiety, admits to meth use, feeling unsafe to go, expresses vague suicidal ideation without any plan, no homicidal ideation; patient also reports left shoulder pain, he said that he fell down couple of days back, had negative x-ray at [facility] that was reviewed in the system  No fever, cough, dyspnea  Vital signs, physical exam reviewed, no significant acute abnormality  Impression:  Anxiety, vague suicidal ideation, patient would like to speak with crisis worker         Amount and/or Complexity of Data Reviewed  Clinical lab tests: reviewed and ordered  Discuss the patient with other providers: yes          Disposition  Final diagnoses:   Anxiety   Left shoulder pain   Suicidal ideation   Drug abuse ([facility] [address] )     Time reflects when diagnosis was documented in both MDM as applicable and the Disposition within this note     Time User Action Codes Description Comment    3/24/2020  1:44 PM Ashley Gee Add [F41 9] Anxiety     3/24/2020  1:45 PM Severiano Duque Add [K60 405] Left shoulder pain     3/24/2020  1:45 PM Severiano Duque Add [B59 773] Suicidal ideation     3/24/2020  1:48 PM Severiano Duque Add [F19 10] Drug abuse [facility])       ED Disposition     ED Disposition Condition Date/Time Comment    Discharge Stable Tue Mar 24, 2020  3:13 PM Malen Hockey discharge to home/self care  MD Documentation      Most Recent Value   Sending MD DR Deng Few       Follow-up Information     Follow up With Specialties Details Why Contact Info Additional Information        PLEASE FOLLOW UP INSTRUCTIONS AS GIVEN TO YOU BY CRISIS WORKER  [address] Orthopedic Surgery  If symptoms worsen [address]  [address] [facility] [address]  [address] [facility], [address], [address], [facility], [place], [address] [phone]          Patient's Medications   Discharge Prescriptions    HYDROXYZINE HCL (ATARAX) 25 MG TABLET    Take 1 tablet (25 mg total) by mouth every 6 (six) hours       Start Date: 3/24/2020 End Date: --       Order Dose: 25 mg       Quantity: 12 tablet    Refills: 0    NAPROXEN (NAPROSYN) 500 MG TABLET    Take 1 tablet (500 mg total) by mouth 2 (two) times a day with meals       Start Date: 3/24/2020 End Date: --       Order Dose: 500 mg       Quantity: 30 tablet    Refills: 0     No discharge procedures on file      PDMP Review     None          ED Provider  Electronically Signed by           Yvonne Bay MD  03/24/20 1363

## 2020-03-25 NOTE — ED PROVIDER NOTES
History  Chief Complaint   Patient presents with    Overdose - Accidental     IV heroin use, unknown amount  4 narcan given IN prehospital     52-year-old gentleman presents after an accidental overdose of heroin  He is unsure how much she use  He states that he typically injects  He has gone through detox and been clean for several months  He required 4 mg of Narcan given intranasally by police  He then revived and has been fully awake and alert since that time  Complains of feeling somewhat nauseated and cold  He denies any concurrent drug or alcohol use  He does smoke one pack cigarettes per day  He denies any recent illness, travel, or other acute issues or concerns at this point time  Overdose - Accidental   Ingested substance:  Illicit drugs  Suspected agents: Heroin  Context: recreational    Associated symptoms: altered mental status, lethargy and unresponsiveness    Associated symptoms: no abdominal pain, no agitation, no anxiety, no chest pain, no cough, no diaphoresis, no diarrhea, no headaches, no nausea, no shortness of breath, no slurred speech, no visual changes and no vomiting        Prior to Admission Medications   Prescriptions Last Dose Informant Patient Reported? Taking?   hydrOXYzine HCL (ATARAX) 25 mg tablet   No No   Sig: Take 1 tablet (25 mg total) by mouth every 6 (six) hours   naproxen (NAPROSYN) 500 mg tablet   No No   Sig: Take 1 tablet (500 mg total) by mouth 2 (two) times a day with meals      Facility-Administered Medications: None       Past Medical History:   Diagnosis Date    Anxiety     PTSD (post-traumatic stress disorder)        Past Surgical History:   Procedure Laterality Date    SHOULDER SURGERY Left        History reviewed  No pertinent family history  I have reviewed and agree with the history as documented      E-Cigarette/Vaping     E-Cigarette/Vaping Substances     Social History     Tobacco Use    Smoking status: Current Every Day Smoker     Packs/day: 1 00     Types: Cigarettes    Smokeless tobacco: Never Used   Substance Use Topics    Alcohol use: Not Currently    Drug use: Yes     Types: Methamphetamines, Heroin       Review of Systems   Constitutional: Negative for activity change, chills, diaphoresis, fatigue and fever  HENT: Negative  Negative for congestion, postnasal drip, rhinorrhea, sinus pain, sore throat and trouble swallowing  Eyes: Negative  Respiratory: Negative  Negative for cough and shortness of breath  Cardiovascular: Negative for chest pain  Gastrointestinal: Negative for abdominal pain, constipation, diarrhea, nausea and vomiting  Endocrine: Negative  Genitourinary: Negative  Musculoskeletal: Negative  Negative for arthralgias, back pain and myalgias  Skin: Negative  Allergic/Immunologic: Negative  Neurological: Negative  Negative for headaches  Hematological: Negative  Psychiatric/Behavioral: Negative for agitation  Physical Exam  Physical Exam   Constitutional: He is oriented to person, place, and time  He appears well-developed and well-nourished  No distress  HENT:   Head: Normocephalic and atraumatic  Nose: Nose normal    Mouth/Throat: Oropharynx is clear and moist    Eyes: Pupils are equal, round, and reactive to light  Conjunctivae are normal    Neck: Neck supple  Cardiovascular: Normal rate, regular rhythm and normal heart sounds  Pulmonary/Chest: Effort normal and breath sounds normal  No respiratory distress  Abdominal: Soft  Bowel sounds are normal  He exhibits no distension  There is no tenderness  There is no guarding  Musculoskeletal: Normal range of motion  He exhibits no edema  Neurological: He is alert and oriented to person, place, and time  Skin: Skin is warm and dry  Capillary refill takes less than 2 seconds  He is not diaphoretic  Psychiatric: He has a normal mood and affect  His behavior is normal    Nursing note and vitals reviewed        Vital Signs  ED Triage Vitals [03/24/20 2057]   Temperature Pulse Respirations Blood Pressure SpO2   (!) 96 5 °F (35 8 °C) (!) 115 16 160/97 98 %      Temp Source Heart Rate Source Patient Position - Orthostatic VS BP Location FiO2 (%)   Tympanic Monitor Sitting Left arm --      Pain Score       --           Vitals:    03/24/20 2057 03/24/20 2130 03/24/20 2200   BP: 160/97 127/67 112/52   Pulse: (!) 115 (!) 115 102   Patient Position - Orthostatic VS: Sitting Lying Lying         Visual Acuity  Visual Acuity      Most Recent Value   L Pupil Size (mm)  2   R Pupil Size (mm)  2          ED Medications  Medications   ondansetron (ZOFRAN-ODT) dispersible tablet 4 mg (4 mg Oral Given 3/24/20 2110)       Diagnostic Studies  Results Reviewed     None                 No orders to display              Procedures  Procedures         ED Course                                 MDM  Number of Diagnoses or Management Options  Accidental overdose of heroin, initial encounter [facility]):   Diagnosis management comments: 80-year-old gentleman presents status post accidental overdose  He has a history of heroin abuse and she was feeling cold and somewhat nauseated  Was given Narcan by [facility] police and has remained fully awake, alert, and oriented since that time  He denies any other acute issues time  Been instructed on the need to abstain additional narcotic abuse  He is aware of reasons return to the ER  Disposition  Final diagnoses:   Accidental overdose of heroin, initial encounter [facility])     Time reflects when diagnosis was documented in both MDM as applicable and the Disposition within this note     Time User Action Codes Description Comment    3/24/2020  9:01 PM Katrin Lugo Add [T40 1X1A] Accidental overdose of heroin, initial encounter [facility])       ED Disposition     ED Disposition Condition Date/Time Comment    Discharge Stable Tue Mar 24, 2020  9:01 PM Chauncey Base discharge to home/self care              Follow-up Information None         Patient's Medications   Discharge Prescriptions    No medications on file     No discharge procedures on file      PDMP Review     None          ED Provider  Electronically Signed by           Lu Kelly,   03/24/20 6317